# Patient Record
Sex: MALE | Race: WHITE | NOT HISPANIC OR LATINO | Employment: FULL TIME | URBAN - NONMETROPOLITAN AREA
[De-identification: names, ages, dates, MRNs, and addresses within clinical notes are randomized per-mention and may not be internally consistent; named-entity substitution may affect disease eponyms.]

---

## 2017-01-27 ENCOUNTER — OFFICE VISIT (OUTPATIENT)
Dept: FAMILY MEDICINE CLINIC | Facility: CLINIC | Age: 52
End: 2017-01-27

## 2017-01-27 VITALS
WEIGHT: 244 LBS | HEIGHT: 68 IN | DIASTOLIC BLOOD PRESSURE: 86 MMHG | BODY MASS INDEX: 36.98 KG/M2 | HEART RATE: 72 BPM | TEMPERATURE: 96 F | SYSTOLIC BLOOD PRESSURE: 138 MMHG

## 2017-01-27 DIAGNOSIS — I10 ESSENTIAL HYPERTENSION: ICD-10-CM

## 2017-01-27 DIAGNOSIS — Z23 ENCOUNTER FOR IMMUNIZATION: ICD-10-CM

## 2017-01-27 DIAGNOSIS — E11.9 TYPE 2 DIABETES MELLITUS WITHOUT COMPLICATION, WITHOUT LONG-TERM CURRENT USE OF INSULIN (HCC): Primary | ICD-10-CM

## 2017-01-27 DIAGNOSIS — Z12.11 SCREENING FOR COLORECTAL CANCER: ICD-10-CM

## 2017-01-27 DIAGNOSIS — Z12.12 SCREENING FOR COLORECTAL CANCER: ICD-10-CM

## 2017-01-27 DIAGNOSIS — E11.9 COMPREHENSIVE DIABETIC FOOT EXAMINATION, TYPE 2 DM, ENCOUNTER FOR (HCC): ICD-10-CM

## 2017-01-27 DIAGNOSIS — B35.1 ONYCHOMYCOSIS: ICD-10-CM

## 2017-01-27 LAB — ALBUMIN UR-MCNC: 0.7 MG/L

## 2017-01-27 PROCEDURE — 90732 PPSV23 VACC 2 YRS+ SUBQ/IM: CPT | Performed by: FAMILY MEDICINE

## 2017-01-27 PROCEDURE — 82043 UR ALBUMIN QUANTITATIVE: CPT | Performed by: FAMILY MEDICINE

## 2017-01-27 PROCEDURE — 99214 OFFICE O/P EST MOD 30 MIN: CPT | Performed by: FAMILY MEDICINE

## 2017-01-27 PROCEDURE — 90471 IMMUNIZATION ADMIN: CPT | Performed by: FAMILY MEDICINE

## 2017-01-27 RX ORDER — LISINOPRIL 5 MG/1
5 TABLET ORAL DAILY
Qty: 90 TABLET | Refills: 2 | Status: SHIPPED | OUTPATIENT
Start: 2017-01-27 | End: 2017-09-05 | Stop reason: SDUPTHER

## 2017-01-27 NOTE — PROGRESS NOTES
Subjective   Chief Complaint   Patient presents with   • lab work follow up     2 month f/u     Cefernio Pereira is a 51 y.o. male.   lab work follow up (2 month f/u)    Diabetes   He presents for his follow-up diabetic visit. He has type 2 diabetes mellitus. His disease course has been stable. There are no hypoglycemic associated symptoms. Pertinent negatives for hypoglycemia include no dizziness or headaches. Associated symptoms include foot paresthesias. Pertinent negatives for diabetes include no blurred vision, no chest pain, no fatigue, no polydipsia, no polyphagia, no polyuria and no visual change. There are no hypoglycemic complications. Symptoms are stable. There are no diabetic complications. Pertinent negatives for diabetic complications include no peripheral neuropathy or retinopathy. Risk factors for coronary artery disease include diabetes mellitus, obesity and sedentary lifestyle. Current diabetic treatment includes diet. He is compliant with treatment all of the time. His weight is stable. He is following a diabetic diet. Meal planning includes avoidance of concentrated sweets. He has not had a previous visit with a dietitian. He never participates in exercise. He monitors blood glucose at home 3-4 x per week. Blood glucose monitoring compliance is excellent. There is no change in his home blood glucose trend. His breakfast blood glucose range is generally 110-130 mg/dl. He does not see a podiatrist.Eye exam is current.      The following portions of the patient's history were reviewed and updated as appropriate: allergies, current medications, past family history, past medical history, past social history, past surgical history and problem list.    Review of Systems   Constitutional: Negative for appetite change, chills, fatigue and fever.   HENT: Negative for congestion, ear pain, rhinorrhea and sore throat.    Eyes: Negative for blurred vision and pain.   Respiratory: Negative for cough and  "shortness of breath.    Cardiovascular: Negative for chest pain and palpitations.   Gastrointestinal: Negative for abdominal pain, constipation, diarrhea and nausea.   Endocrine: Negative for polydipsia, polyphagia and polyuria.   Genitourinary: Negative for dysuria.   Musculoskeletal: Negative for back pain, joint swelling and neck pain.   Skin: Negative for rash.   Neurological: Negative for dizziness and headaches.     Objective   Visit Vitals   • /86   • Pulse 72   • Temp 96 °F (35.6 °C)   • Ht 68\" (172.7 cm)   • Wt 244 lb (111 kg)   • BMI 37.1 kg/m2     Physical Exam   Constitutional: He is oriented to person, place, and time. He appears well-developed and well-nourished.   HENT:   Head: Normocephalic and atraumatic.   Eyes: Pupils are equal, round, and reactive to light.   Neck: Normal range of motion. Neck supple.   Cardiovascular: Normal rate, regular rhythm and normal heart sounds.    Pulmonary/Chest: Effort normal and breath sounds normal. No respiratory distress. He has no wheezes. He has no rales.   Abdominal: Soft. Bowel sounds are normal.   Musculoskeletal: Normal range of motion.    Ceferino had a diabetic foot exam performed today.   During the foot exam he had a monofilament test performed (10/10).    Neurological Sensory Findings -  Unaltered sharp/dull right ankle/foot discrimination and unaltered sharp/dull left ankle/foot discrimination. No right ankle/foot altered proprioception and no left ankle/foot altered proprioception    Vascular Status -  His exam exhibits right foot vasculature normal. His exam exhibits no right foot edema. His exam exhibits left foot vasculature normal. His exam exhibits no left foot edema.   Skin Integrity  -  His right foot skin is intact. He has right foot onychomycosis.  He hasno right foot ulcer, non-callous right foot,  no ingrown toenail on right foot, right heel is not dry and cracked, no right foot warmth and no right foot blister.   Ceferino's left foot skin is " intact. He has left foot onychomycosis. He has no left foot ulcer, non-callous left foot, no left ingrown toenail, no left heel dry and cracked, no left foot warmth and no left foot blister..  Neurological: He is alert and oriented to person, place, and time.   Skin: Skin is warm and dry.   Psychiatric: He has a normal mood and affect.   Nursing note and vitals reviewed.      Assessment/Plan   Problems Addressed this Visit        Cardiovascular and Mediastinum    Essential hypertension    Relevant Medications    lisinopril (PRINIVIL,ZESTRIL) 5 MG tablet       Endocrine    Type 2 diabetes mellitus without complication, without long-term current use of insulin - Primary    Relevant Orders    MicroAlbumin, Urine, Random       Musculoskeletal and Integument    Onychomycosis      Other Visit Diagnoses     Encounter for immunization        Relevant Orders    Pneumococcal Polysaccharide Vaccine 23-Valent Greater Than or Equal To 1yo Subcutaneous / IM (Completed)    Screening for colorectal cancer        Relevant Orders    Ambulatory Referral to General Surgery    Comprehensive diabetic foot examination, type 2 DM, encounter for            Continue with diet for dm    Continue with lamsil until course completed    Add lisinopril today for kidney and bp    Microalbuminuria test today    Pneumococcal vaccine today    Dm eye exam done, documented    Referral to general surgery with Dr Ward for screening colonoscopy    Recheck in 3 months

## 2017-01-27 NOTE — MR AVS SNAPSHOT
Ceferino Pereira   1/27/2017 3:30 PM   Office Visit    Dept Phone:  [phone]   Encounter #:  [id number]    Provider:  Kisha Tomlin MD   Department:  [facility]                Your Full Care Plan              Today's Medication Changes          These changes are accurate as of: 1/27/17  3:53 PM.  If you have any questions, ask your nurse or doctor.               New Medication(s)Ordered:     lisinopril 5 MG tablet   Commonly known as:  PRINIVIL,ZESTRIL   Take 1 tablet by mouth Daily.   Started by:  Kisha Tomlin MD            Where to Get Your Medications      These medications were sent to [facility] - [place], [place] - [address] - [phone]  - [phone]   [address] [facility] [address]     Phone:  [phone]     lisinopril 5 MG tablet                  Your Updated Medication List          This list is accurate as of: 1/27/17  3:53 PM.  Always use your most recent med list.                lisinopril 5 MG tablet   Commonly known as:  PRINIVIL,ZESTRIL   Take 1 tablet by mouth Daily.       terbinafine 250 MG tablet   Commonly known as:  lamiSIL   Take 1 tablet by mouth Daily.               We Performed the Following     Ambulatory Referral to General Surgery     MicroAlbumin, Urine, Random     Pneumococcal Polysaccharide Vaccine 23-Valent Greater Than or Equal To 3yo Subcutaneous / IM       You Were Diagnosed With        Codes Comments    Type 2 diabetes mellitus without complication, without long-term current use of insulin    -  Primary ICD-10-CM: E11.9  ICD-9-CM: 250.00     Onychomycosis     ICD-10-CM: B35.1  ICD-9-CM: 110.1     Encounter for immunization     ICD-10-CM: Z23  ICD-9-CM: V03.89     Essential hypertension     ICD-10-CM: I10  ICD-9-CM: 401.9     Screening for colorectal cancer     ICD-10-CM: Z12.11, Z12.12  ICD-9-CM: V76.51     Comprehensive diabetic foot examination, type 2 DM,  "encounter for     ICD-10-CM: E11.9  ICD-9-CM: 250.00       Instructions     None    Patient Instructions History      Upcoming Appointments     Visit Type Date Time Department    OFFICE VISIT 1/27/2017  3:30 PM [facility]    FOLLOW UP 10/26/2017  3:30 PM [facility] DANNI Bustillo Signup     Our records indicate that you have declined [facility] [email or web address] signup. If you would like to sign up for [email or web address], please email [place] [email or web address] or call [phone] to obtain an activation code.             Other Info from Your Visit           Your Appointments     Oct 26, 2017  3:30 PM CDT   Follow Up with [facility]EP [facility] (--)    [address] [place] [address] [address] [place] [address] [phone]           Arrive 15 minutes prior to appointment.              Allergies     Penicillins        Reason for Visit     lab work follow up 2 month f/u      Vital Signs     Blood Pressure Pulse Temperature Height Weight Body Mass Index    138/86 72 96 °F (35.6 °C) 68\" (172.7 cm) 244 lb (111 kg) 37.1 kg/m2    Smoking Status                   Never Smoker           Problems and Diagnoses Noted     Onychomycosis    Type 2 diabetes mellitus without complication, without long-term current use of insulin    Encounter for immunization        High blood pressure        Screen for colon cancer        Comprehensive diabetic foot examination, type 2 DM, encounter for          No Longer an Issue     Cellulitis and abscess of foot    Foot pain, right        "

## 2017-02-03 DIAGNOSIS — Z12.11 SCREENING FOR MALIGNANT NEOPLASM OF COLON: Primary | ICD-10-CM

## 2017-02-06 RX ORDER — SODIUM CHLORIDE 0.9 % (FLUSH) 0.9 %
1-10 SYRINGE (ML) INJECTION AS NEEDED
Status: CANCELLED | OUTPATIENT
Start: 2017-03-03

## 2017-03-03 ENCOUNTER — ANESTHESIA EVENT (OUTPATIENT)
Dept: GASTROENTEROLOGY | Facility: HOSPITAL | Age: 52
End: 2017-03-03

## 2017-03-03 ENCOUNTER — HOSPITAL ENCOUNTER (OUTPATIENT)
Facility: HOSPITAL | Age: 52
Setting detail: HOSPITAL OUTPATIENT SURGERY
Discharge: HOME | End: 2017-03-03
Attending: SURGERY | Admitting: SURGERY

## 2017-03-03 ENCOUNTER — ANESTHESIA (OUTPATIENT)
Dept: GASTROENTEROLOGY | Facility: HOSPITAL | Age: 52
End: 2017-03-03

## 2017-03-03 VITALS
OXYGEN SATURATION: 97 % | HEART RATE: 64 BPM | DIASTOLIC BLOOD PRESSURE: 72 MMHG | WEIGHT: 235.89 LBS | TEMPERATURE: 97.3 F | RESPIRATION RATE: 18 BRPM | HEIGHT: 68 IN | SYSTOLIC BLOOD PRESSURE: 114 MMHG | BODY MASS INDEX: 35.75 KG/M2

## 2017-03-03 DIAGNOSIS — Z12.11 SCREENING FOR MALIGNANT NEOPLASM OF COLON: ICD-10-CM

## 2017-03-03 PROCEDURE — 45378 DIAGNOSTIC COLONOSCOPY: CPT | Performed by: SURGERY

## 2017-03-03 PROCEDURE — 25010000002 PROPOFOL 10 MG/ML EMULSION: Performed by: NURSE ANESTHETIST, CERTIFIED REGISTERED

## 2017-03-03 RX ORDER — PROPOFOL 10 MG/ML
VIAL (ML) INTRAVENOUS AS NEEDED
Status: DISCONTINUED | OUTPATIENT
Start: 2017-03-03 | End: 2017-03-03

## 2017-03-03 RX ORDER — SODIUM CHLORIDE 0.9 % (FLUSH) 0.9 %
1-10 SYRINGE (ML) INJECTION AS NEEDED
Status: DISCONTINUED | OUTPATIENT
Start: 2017-03-03 | End: 2017-03-03 | Stop reason: HOSPADM

## 2017-03-03 RX ORDER — PROPOFOL 10 MG/ML
VIAL (ML) INTRAVENOUS AS NEEDED
Status: DISCONTINUED | OUTPATIENT
Start: 2017-03-03 | End: 2017-03-03 | Stop reason: SURG

## 2017-03-03 RX ORDER — DEXTROSE AND SODIUM CHLORIDE 5; .45 G/100ML; G/100ML
20 INJECTION, SOLUTION INTRAVENOUS CONTINUOUS
Status: DISCONTINUED | OUTPATIENT
Start: 2017-03-03 | End: 2017-03-03 | Stop reason: HOSPADM

## 2017-03-03 RX ADMIN — PROPOFOL 40 MG: 10 INJECTION, EMULSION INTRAVENOUS at 10:39

## 2017-03-03 RX ADMIN — PROPOFOL 50 MG: 10 INJECTION, EMULSION INTRAVENOUS at 10:33

## 2017-03-03 RX ADMIN — PROPOFOL 30 MG: 10 INJECTION, EMULSION INTRAVENOUS at 10:30

## 2017-03-03 RX ADMIN — PROPOFOL 100 MG: 10 INJECTION, EMULSION INTRAVENOUS at 10:27

## 2017-03-03 RX ADMIN — PROPOFOL 40 MG: 10 INJECTION, EMULSION INTRAVENOUS at 10:35

## 2017-03-03 RX ADMIN — PROPOFOL 30 MG: 10 INJECTION, EMULSION INTRAVENOUS at 10:37

## 2017-03-03 RX ADMIN — DEXTROSE AND SODIUM CHLORIDE 20 ML/HR: 5; 450 INJECTION, SOLUTION INTRAVENOUS at 10:05

## 2017-03-03 NOTE — PLAN OF CARE
Problem: GI Endoscopy (Adult)  Goal: Signs and Symptoms of Listed Potential Problems Will be Absent or Manageable (GI Endoscopy)    03/03/17 1042   GI Endoscopy   Problems Assessed (GI Endoscopy) all   Problems Present (GI Endoscopy) none         Problem: Patient Care Overview (Adult)  Goal: Plan of Care Review    03/03/17 1042   Coping/Psychosocial Response Interventions   Plan Of Care Reviewed With patient   Patient Care Overview   Progress no change   Outcome Evaluation   Outcome Summary/Follow up Plan vss, pt alert, no nausea

## 2017-03-03 NOTE — ANESTHESIA PREPROCEDURE EVALUATION
Anesthesia Evaluation     no history of anesthetic complications:  NPO Status: > 4 hours   Airway   Mallampati: II  TM distance: >3 FB  Neck ROM: full  no difficulty expected  Dental - normal exam     Pulmonary - normal exam   (+) recent URI, sleep apnea on CPAP,   Cardiovascular - normal exam    (+) hypertension well controlled,       Neuro/Psych  GI/Hepatic/Renal/Endo    (+)  GERD well controlled, diabetes mellitus,     ROS Comment: Borderline DM    Musculoskeletal     Abdominal    Substance History - negative use     OB/GYN          Other   (+) arthritis                                 Anesthesia Plan    ASA 3     MAC     intravenous induction   Anesthetic plan and risks discussed with patient.    Plan discussed with CRNA.

## 2017-03-03 NOTE — ANESTHESIA POSTPROCEDURE EVALUATION
Patient: Ceferino Pereira    Procedure Summary     Date Anesthesia Start Anesthesia Stop Room / Location    03/03/17 1020 1042 [facility] ENDOSCOPY 2 / [facility] ENDOSCOPY       Procedure Diagnosis Surgeon Provider    COLONOSCOPY (N/A ) Screening for malignant neoplasm of colon  (Screening for malignant neoplasm of colon [Z12.11]) Delmar Ward MD No responsible provider of record.          Anesthesia Type: MAC  Last vitals  /72 (03/03/17 0957)    Temp 97.9 °F (36.6 °C) (03/03/17 0957)    Pulse 62 (03/03/17 0957)   Resp 18 (03/03/17 0957)    SpO2 98 % (03/03/17 0957)      Post Anesthesia Care and Evaluation    Patient location during evaluation: bedside  Patient participation: complete - patient participated  Level of consciousness: awake and alert  Pain score: 0  Pain management: adequate  Airway patency: patent  Anesthetic complications: No anesthetic complications  PONV Status: none  Cardiovascular status: acceptable  Respiratory status: acceptable  Hydration status: acceptable

## 2017-03-03 NOTE — PLAN OF CARE
Problem: GI Endoscopy (Adult)  Goal: Signs and Symptoms of Listed Potential Problems Will be Absent or Manageable (GI Endoscopy)  Outcome: Outcome(s) achieved Date Met:  03/03/17 03/03/17 1054   GI Endoscopy   Problems Assessed (GI Endoscopy) all   Problems Present (GI Endoscopy) none         Problem: Patient Care Overview (Adult)  Goal: Plan of Care Review  Outcome: Outcome(s) achieved Date Met:  03/03/17 03/03/17 1054   Coping/Psychosocial Response Interventions   Plan Of Care Reviewed With patient   Patient Care Overview   Progress no change

## 2017-03-03 NOTE — H&P
No chief complaint on file.  Dr Tomlin referred for screening    Ceferino Pereira is a 51 y.o. male referred today for evaluation for colonoscopy.  He notes no change in bowel habits, no blood in the stool.     Prior Colonoscopy:no  Prior Polyps:no  Family History of Colon Cancer:yes  On anticoagulation:no    Past Surgical History   Procedure Laterality Date   • Injection of medication  05/08/2013     kenalog   • Foot surgery       infection top of foot   • Hand surgery       Past Medical History   Diagnosis Date   • Aching leg syndrome    • Acute bronchitis    • Acute maxillary sinusitis    • Adult BMI 30+      obesity   • Ankle joint pain    • Arthritis    • Bacterial infectious disease    • Blood in feces    • BMI 37.0-37.9, adult    • Carpal tunnel syndrome    • Chronic pharyngitis    • Contact dermatitis    • Derangement of posterior horn of medial meniscus due to old tear or injury, unspecified knee      Old tear of posterior horn of medial meniscus    • Derangement of unspecified medial meniscus due to old tear or injury, left knee    • Diabetes mellitus    • Diabetes mellitus screening    • Fatigue    • Gastroesophageal reflux disease    • General medical exam    • Health maintenance examination      individual health exam   • Infected insect bite    • Knee pain    • Nausea and vomiting    • Need for immunization against influenza    • LAVELL on CPAP    • Osteoarthritis    • Osteoarthritis of knee    • Plantar fasciitis    • Radicular pain of lumbosacral region      Pain radiating to lumbar region of back     • Screening for hyperlipidemia    • Shoulder pain      left shoulder   • Thyroid disorder screening    • Upper respiratory infection    • Urticaria      Social History     Social History   • Marital status:      Spouse name: N/A   • Number of children: N/A   • Years of education: N/A     Occupational History   • Not on file.     Social History Main Topics   • Smoking status: Never Smoker   •  Smokeless tobacco: Not on file   • Alcohol use No   • Drug use: No   • Sexual activity: Defer     Other Topics Concern   • Not on file     Social History Narrative     Current Facility-Administered Medications   Medication Dose Route Frequency Provider Last Rate Last Dose   • dextrose 5 % and sodium chloride 0.45 % infusion  20 mL/hr Intravenous Continuous Delmar Ward MD 20 mL/hr at 03/03/17 1005 20 mL/hr at 03/03/17 1005   • sodium chloride 0.9 % flush 1-10 mL  1-10 mL Intravenous PRN Delmar Ward MD           Review of Systems   Constitutional: Negative.    HENT: Negative for hearing loss, nosebleeds and trouble swallowing.    Respiratory: Negative for apnea, chest tightness and shortness of breath.    Cardiovascular: Negative for chest pain and palpitations.   Gastrointestinal: Negative for abdominal distention, abdominal pain, blood in stool, constipation, diarrhea, nausea and vomiting.   Genitourinary: Negative for difficulty urinating, dysuria, frequency and urgency.   Musculoskeletal: Negative for back pain, joint swelling and neck pain.   Skin: Negative for rash.   Neurological: Negative for dizziness, seizures, weakness, light-headedness, numbness and headaches.   Hematological: Negative for adenopathy.   Psychiatric/Behavioral: Negative for agitation. The patient is not nervous/anxious.        Vitals:    03/03/17 0957   BP: 117/72   Pulse: 62   Resp: 18   Temp: 97.9 °F (36.6 °C)   SpO2: 98%       Physical Exam   Constitutional: He is oriented to person, place, and time. He appears well-developed and well-nourished. No distress.   HENT:   Head: Normocephalic and atraumatic.   Nose: Nose normal.   Eyes: Conjunctivae are normal. No scleral icterus.   Neck: Normal range of motion. No JVD present. No tracheal deviation present. No thyromegaly present.   Cardiovascular: Normal rate, regular rhythm and normal heart sounds.    No murmur heard.  Pulmonary/Chest: Effort normal and breath sounds normal. No  stridor. No respiratory distress. He has no wheezes. He has no rales. He exhibits no tenderness.   Abdominal: Soft. He exhibits no distension. There is no tenderness. There is no rebound and no guarding. No hernia.   Musculoskeletal: He exhibits no tenderness or deformity.   Neurological: He is alert and oriented to person, place, and time.   Skin: Skin is warm and dry. No rash noted.   Psychiatric: He has a normal mood and affect. His behavior is normal. Judgment and thought content normal.   Vitals reviewed.      Assessment     In need of screening colonoscopy.    Plan     Risks, benefits, rationale and prep for colonoscopy have been discussed with the patient.  The patient indicates understanding of these issues and agrees with the plan.

## 2017-09-05 ENCOUNTER — LAB (OUTPATIENT)
Dept: LAB | Facility: OTHER | Age: 52
End: 2017-09-05

## 2017-09-05 DIAGNOSIS — E11.9 TYPE 2 DIABETES MELLITUS WITHOUT COMPLICATION, WITHOUT LONG-TERM CURRENT USE OF INSULIN (HCC): ICD-10-CM

## 2017-09-05 DIAGNOSIS — R53.83 FATIGUE, UNSPECIFIED TYPE: ICD-10-CM

## 2017-09-05 DIAGNOSIS — I10 ESSENTIAL HYPERTENSION: ICD-10-CM

## 2017-09-05 DIAGNOSIS — E11.9 TYPE 2 DIABETES MELLITUS WITHOUT COMPLICATION, WITHOUT LONG-TERM CURRENT USE OF INSULIN (HCC): Primary | ICD-10-CM

## 2017-09-05 LAB
HBA1C MFR BLD: 6.4 % (ref 4–5.6)
TSH SERPL DL<=0.05 MIU/L-ACNC: 3.31 MIU/ML (ref 0.46–4.68)

## 2017-09-05 PROCEDURE — 80074 ACUTE HEPATITIS PANEL: CPT | Performed by: FAMILY MEDICINE

## 2017-09-05 PROCEDURE — 84443 ASSAY THYROID STIM HORMONE: CPT | Performed by: FAMILY MEDICINE

## 2017-09-05 PROCEDURE — 83036 HEMOGLOBIN GLYCOSYLATED A1C: CPT | Performed by: FAMILY MEDICINE

## 2017-09-05 PROCEDURE — 36415 COLL VENOUS BLD VENIPUNCTURE: CPT | Performed by: FAMILY MEDICINE

## 2017-09-05 RX ORDER — LISINOPRIL 5 MG/1
5 TABLET ORAL DAILY
Qty: 90 TABLET | Refills: 2 | Status: SHIPPED | OUTPATIENT
Start: 2017-09-05 | End: 2018-01-08 | Stop reason: SDUPTHER

## 2017-09-05 NOTE — TELEPHONE ENCOUNTER
[facility]  [facility]

## 2017-09-06 LAB
HAV IGM SERPL QL IA: NEGATIVE
HBV CORE IGM SERPL QL IA: NEGATIVE
HBV SURFACE AG SERPL QL IA: NEGATIVE
HCV AB SER DONR QL: NEGATIVE

## 2017-10-09 ENCOUNTER — OFFICE VISIT (OUTPATIENT)
Dept: FAMILY MEDICINE CLINIC | Facility: CLINIC | Age: 52
End: 2017-10-09

## 2017-10-09 VITALS
WEIGHT: 241 LBS | DIASTOLIC BLOOD PRESSURE: 84 MMHG | BODY MASS INDEX: 36.53 KG/M2 | OXYGEN SATURATION: 98 % | SYSTOLIC BLOOD PRESSURE: 130 MMHG | HEIGHT: 68 IN | TEMPERATURE: 97 F | HEART RATE: 68 BPM

## 2017-10-09 DIAGNOSIS — E11.9 TYPE 2 DIABETES MELLITUS WITHOUT COMPLICATION, WITHOUT LONG-TERM CURRENT USE OF INSULIN (HCC): Primary | ICD-10-CM

## 2017-10-09 DIAGNOSIS — Z23 INFLUENZA VACCINE NEEDED: ICD-10-CM

## 2017-10-09 DIAGNOSIS — E66.09 CLASS 2 OBESITY DUE TO EXCESS CALORIES WITHOUT SERIOUS COMORBIDITY WITH BODY MASS INDEX (BMI) OF 36.0 TO 36.9 IN ADULT: ICD-10-CM

## 2017-10-09 PROBLEM — I10 ESSENTIAL HYPERTENSION: Status: RESOLVED | Noted: 2017-01-27 | Resolved: 2017-10-09

## 2017-10-09 PROCEDURE — 99214 OFFICE O/P EST MOD 30 MIN: CPT | Performed by: FAMILY MEDICINE

## 2017-10-09 PROCEDURE — 90471 IMMUNIZATION ADMIN: CPT | Performed by: FAMILY MEDICINE

## 2017-10-09 PROCEDURE — 90686 IIV4 VACC NO PRSV 0.5 ML IM: CPT | Performed by: FAMILY MEDICINE

## 2017-10-09 NOTE — PROGRESS NOTES
Subjective   Chief Complaint   Patient presents with   • Diabetes     9 months   • Flu Vaccine     Ceferino Pereira is a 52 y.o. male.   Diabetes (9 months) and Flu Vaccine    History of Present Illness     Diabetes   He presents for his follow-up diabetic visit. He has type 2 diabetes mellitus. His disease course has been stable. There are no hypoglycemic associated symptoms. Pertinent negatives for hypoglycemia include no dizziness or headaches. Associated symptoms include foot paresthesias. Pertinent negatives for diabetes include no blurred vision, no chest pain, no fatigue, no polydipsia, no polyphagia, no polyuria and no visual change. There are no hypoglycemic complications. Symptoms are stable. There are no diabetic complications. Pertinent negatives for diabetic complications include no peripheral neuropathy or retinopathy. Risk factors for coronary artery disease include diabetes mellitus, obesity and sedentary lifestyle. Current diabetic treatment includes diet. He is compliant with treatment all of the time. His weight is variable. He is following a diabetic diet. Meal planning includes avoidance of concentrated sweets. He has not had a previous visit with a dietitian. He never participates in exercise. He monitors blood glucose at home 3-4 x per week. Blood glucose monitoring compliance is excellent. There is no change in his home blood glucose trend. His breakfast blood glucose range is generally 110-130 mg/dl. He does not see a podiatrist. Eye exam is current.   Recommended annual diabetic eye exam - due in November    Flu vaccine today.    Obesity - remains uncontrolled, weight is variable    The following portions of the patient's history were reviewed and updated as appropriate: allergies, current medications, past family history, past medical history, past social history, past surgical history and problem list.    Review of Systems   Constitutional: Negative for appetite change, chills, fatigue  "and fever.   HENT: Negative for congestion, ear pain, rhinorrhea and sore throat.    Eyes: Negative for pain.   Respiratory: Negative for cough and shortness of breath.    Cardiovascular: Negative for chest pain and palpitations.   Gastrointestinal: Negative for abdominal pain, constipation, diarrhea and nausea.   Genitourinary: Negative for dysuria.   Musculoskeletal: Negative for back pain, joint swelling and neck pain.   Skin: Negative for rash.   Neurological: Negative for dizziness and headaches.       Objective   /84  Pulse 68  Temp 97 °F (36.1 °C)  Ht 68\" (172.7 cm)  Wt 241 lb (109 kg)  SpO2 98%  BMI 36.64 kg/m2  Physical Exam   Constitutional: He is oriented to person, place, and time. He appears well-developed and well-nourished.   HENT:   Head: Normocephalic and atraumatic.   Eyes: Pupils are equal, round, and reactive to light.   Neck: Normal range of motion. Neck supple.   Cardiovascular: Normal rate, regular rhythm and normal heart sounds.    Pulmonary/Chest: Effort normal and breath sounds normal. No respiratory distress. He has no wheezes. He has no rales.   Abdominal: Soft. Bowel sounds are normal.   Musculoskeletal: Normal range of motion.   Neurological: He is alert and oriented to person, place, and time.   Skin: Skin is warm and dry.   Psychiatric: He has a normal mood and affect.   Nursing note and vitals reviewed.      Assessment/Plan   Problems Addressed this Visit        Digestive    Class 2 obesity due to excess calories without serious comorbidity with body mass index (BMI) of 36.0 to 36.9 in adult       Endocrine    Type 2 diabetes mellitus without complication, without long-term current use of insulin - Primary      Other Visit Diagnoses     Influenza vaccine needed        Relevant Orders    Flu Vaccine Quad PF 3YR+ (Completed)        Flu shot given today  Consent signed    Reviewed maintenance  Diabetic eye exam due in November  Recommended an appointment to be done before " next office visit  Let Mary know when seen so we can ask for evaluation    Recheck in 3 months  For labs and diabetic foot exam

## 2017-11-08 ENCOUNTER — OFFICE VISIT (OUTPATIENT)
Dept: SLEEP MEDICINE | Facility: HOSPITAL | Age: 52
End: 2017-11-08

## 2017-11-08 VITALS
SYSTOLIC BLOOD PRESSURE: 130 MMHG | WEIGHT: 243 LBS | BODY MASS INDEX: 36.83 KG/M2 | HEIGHT: 68 IN | DIASTOLIC BLOOD PRESSURE: 80 MMHG

## 2017-11-08 DIAGNOSIS — G47.33 OBSTRUCTIVE SLEEP APNEA, ADULT: Primary | ICD-10-CM

## 2017-11-08 PROCEDURE — 99213 OFFICE O/P EST LOW 20 MIN: CPT | Performed by: INTERNAL MEDICINE

## 2017-11-08 NOTE — PROGRESS NOTES
Sleep Clinic Follow Up    Date: 11/8/2017  Primary Care Physician: Kisha Tomlin MD      Interim History (1/3):  Since the last visit on 10/25/2016, patient has:      1)  LAVELL - Has remained compliant with CPAP. He denies mask and machine issues, dry mouth, headaches, pressures intolerance, or non-compliance. He denies abnormal dreams, sleep paralysis, hypnagogic hallucinations, or cataplexy symptoms.     PAP Data:  Time frame: 10/25/2016-11/07/2017   Compliance 100 %  PAP range : 15 cm H2O  Average 90% pressure: 15 cmH2O  Leak: 25.75 minutes   Average AHI 2.5 events/hr  Mask type: nasal with chinstrap  DME: [facility]    Bed time: 2100  Sleep latency: 10-20 minutes  Number of times awakens during the night: 2-3  Wake time: 0500  Estimated total sleep time at night: 6-7 hours  Caffeine intake: 1-2 cafes, (1) 20 oz soda  Alcohol intake: no  Nap time: none  Sleepiness with Driving: none    [place] - 4    PMHx, FH, SH reviewed and pertinent changes are: unchanged from last office visit on 10/25/2016      REVIEW OF SYSTEMS:   Negative for chest pain, fever, chills, SOA, abdominal pain. Smoking: none      Exam (6-11/12):    Vitals:    11/08/17 1606   BP: 130/80     Body mass index is 36.95 kg/(m^2).  Gen:  No distress, conversant, pleasant, appears stated age, alert, oriented  Eyes:   Anicteric sclera, moist conjunctiva, no lid lag     PERRLA, EOMI   Heent:   NC/AT    Oropharynx clear, Mallampati 4, overbite    normal hearing    Lungs:  Normal effort, non-labored breathing    Clear to auscultation    CV:  Normal S1/S2, no murmur    no lower extremity edema  ABD:  Soft, normal bowel sounds, obese       Psych:  Appropriate affect  Neuro:  CN 2-12 intact    Past Medical History:   Diagnosis Date   • Aching leg syndrome    • Acute bronchitis    • Acute maxillary sinusitis    • Adult BMI 30+     obesity   • Ankle joint pain    • Arthritis    • Bacterial infectious disease    • Blood in feces    • BMI 37.0-37.9, adult    •  Carpal tunnel syndrome    • Chronic pharyngitis    • Contact dermatitis    • Derangement of posterior horn of medial meniscus due to old tear or injury, unspecified knee     Old tear of posterior horn of medial meniscus    • Derangement of unspecified medial meniscus due to old tear or injury, left knee    • Diabetes mellitus    • Diabetes mellitus screening    • Fatigue    • Gastroesophageal reflux disease    • General medical exam    • Health maintenance examination     individual health exam   • Infected insect bite    • Knee pain    • Nausea and vomiting    • Need for immunization against influenza    • LAVELL on CPAP    • Osteoarthritis    • Osteoarthritis of knee    • Plantar fasciitis    • Radicular pain of lumbosacral region     Pain radiating to lumbar region of back     • Screening for hyperlipidemia    • Shoulder pain     left shoulder   • Thyroid disorder screening    • Upper respiratory infection    • Urticaria        Current Outpatient Prescriptions:   •  lisinopril (PRINIVIL,ZESTRIL) 5 MG tablet, Take 1 tablet by mouth Daily., Disp: 90 tablet, Rfl: 2      ASSESSMENT / PLAN:     1. Obstructive sleep apnea  1. PSG on 10/27/2015, AHI of 95  2. CPAP titration on same day, recommended 14 cm H2O  3. Currently on 15 cm H2O  4. Continue PAP as prescribed.   5. Script for PAP supplies  6. Return to clinic in 1 year with compliance check unless sx change in the interim period.    Total time 15 min, more than half spent in face to face counseling and coordination of care.     This document has been electronically signed by Art Cornell MD on November 8, 2017         CC: Kisha Tomlin MD          No ref. provider found

## 2018-01-03 ENCOUNTER — LAB (OUTPATIENT)
Dept: LAB | Facility: HOSPITAL | Age: 53
End: 2018-01-03

## 2018-01-03 DIAGNOSIS — Z00.00 ROUTINE GENERAL MEDICAL EXAMINATION AT A HEALTH CARE FACILITY: Primary | ICD-10-CM

## 2018-01-03 DIAGNOSIS — R53.83 FATIGUE, UNSPECIFIED TYPE: ICD-10-CM

## 2018-01-03 DIAGNOSIS — Z00.00 ROUTINE GENERAL MEDICAL EXAMINATION AT A HEALTH CARE FACILITY: ICD-10-CM

## 2018-01-03 LAB
ALBUMIN SERPL-MCNC: 4.4 G/DL (ref 3.4–4.8)
ALBUMIN/GLOB SERPL: 1.4 G/DL (ref 1.1–1.8)
ALP SERPL-CCNC: 72 U/L (ref 38–126)
ALT SERPL W P-5'-P-CCNC: 34 U/L (ref 21–72)
ANION GAP SERPL CALCULATED.3IONS-SCNC: 8 MMOL/L (ref 5–15)
AST SERPL-CCNC: 58 U/L (ref 17–59)
BILIRUB SERPL-MCNC: 0.5 MG/DL (ref 0.2–1.3)
BUN BLD-MCNC: 11 MG/DL (ref 7–21)
BUN/CREAT SERPL: 15.7 (ref 7–25)
CALCIUM SPEC-SCNC: 9.8 MG/DL (ref 8.4–10.2)
CHLORIDE SERPL-SCNC: 101 MMOL/L (ref 95–110)
CO2 SERPL-SCNC: 30 MMOL/L (ref 22–31)
CREAT BLD-MCNC: 0.7 MG/DL (ref 0.7–1.3)
GFR SERPL CREATININE-BSD FRML MDRD: 118 ML/MIN/1.73 (ref 56–130)
GLOBULIN UR ELPH-MCNC: 3.1 GM/DL (ref 2.3–3.5)
GLUCOSE BLD-MCNC: 127 MG/DL (ref 60–100)
HBA1C MFR BLD: 6.1 % (ref 4–5.6)
POTASSIUM BLD-SCNC: 5 MMOL/L (ref 3.5–5.1)
PROT SERPL-MCNC: 7.5 G/DL (ref 6.3–8.6)
SODIUM BLD-SCNC: 139 MMOL/L (ref 137–145)
T4 FREE SERPL-MCNC: 1.07 NG/DL (ref 0.78–2.19)

## 2018-01-03 PROCEDURE — 83036 HEMOGLOBIN GLYCOSYLATED A1C: CPT

## 2018-01-03 PROCEDURE — 36415 COLL VENOUS BLD VENIPUNCTURE: CPT

## 2018-01-03 PROCEDURE — 84439 ASSAY OF FREE THYROXINE: CPT

## 2018-01-03 PROCEDURE — 80053 COMPREHEN METABOLIC PANEL: CPT

## 2018-01-04 ENCOUNTER — TELEPHONE (OUTPATIENT)
Dept: FAMILY MEDICINE CLINIC | Facility: CLINIC | Age: 53
End: 2018-01-04

## 2018-01-04 NOTE — TELEPHONE ENCOUNTER
----- Message from Kisha Tomlin MD sent at 1/3/2018 11:03 AM CST -----  a1c at goal,  Thyroid normal.

## 2018-01-08 ENCOUNTER — OFFICE VISIT (OUTPATIENT)
Dept: FAMILY MEDICINE CLINIC | Facility: CLINIC | Age: 53
End: 2018-01-08

## 2018-01-08 VITALS
WEIGHT: 239 LBS | SYSTOLIC BLOOD PRESSURE: 132 MMHG | DIASTOLIC BLOOD PRESSURE: 78 MMHG | HEIGHT: 68 IN | TEMPERATURE: 99 F | OXYGEN SATURATION: 98 % | HEART RATE: 92 BPM | BODY MASS INDEX: 36.22 KG/M2

## 2018-01-08 DIAGNOSIS — E11.9 TYPE 2 DIABETES MELLITUS WITHOUT COMPLICATION, WITHOUT LONG-TERM CURRENT USE OF INSULIN (HCC): Primary | ICD-10-CM

## 2018-01-08 DIAGNOSIS — E11.9 ENCOUNTER FOR COMPREHENSIVE DIABETIC FOOT EXAMINATION, TYPE 2 DIABETES MELLITUS (HCC): ICD-10-CM

## 2018-01-08 DIAGNOSIS — I10 ESSENTIAL HYPERTENSION: ICD-10-CM

## 2018-01-08 PROCEDURE — 99213 OFFICE O/P EST LOW 20 MIN: CPT | Performed by: FAMILY MEDICINE

## 2018-01-08 RX ORDER — LISINOPRIL 10 MG/1
10 TABLET ORAL DAILY
Qty: 90 TABLET | Refills: 1 | Status: SHIPPED | OUTPATIENT
Start: 2018-01-08 | End: 2018-04-09 | Stop reason: SDUPTHER

## 2018-01-08 NOTE — PROGRESS NOTES
Subjective   Chief Complaint   Patient presents with   • Diabetes     diabetic foot exam     Ceferino Pereira is a 52 y.o. male.   Diabetes (diabetic foot exam)    History of Present Illness     Diabetes   He presents for his follow-up diabetic visit. He has type 2 diabetes mellitus. His disease course has been stable. There are no hypoglycemic associated symptoms. Pertinent negatives for hypoglycemia include no dizziness or headaches. Associated symptoms include foot paresthesias. Pertinent negatives for diabetes include no blurred vision, no chest pain, no fatigue, no polydipsia, no polyphagia, no polyuria and no visual change. There are no hypoglycemic complications. Symptoms are stable. There are no diabetic complications. Pertinent negatives for diabetic complications include no peripheral neuropathy or retinopathy. Risk factors for coronary artery disease include diabetes mellitus, obesity and sedentary lifestyle. Current diabetic treatment includes diet. He is compliant with treatment all of the time. His weight is variable. He is following a diabetic diet. Meal planning includes avoidance of concentrated sweets. He has not had a previous visit with a dietitian. He never participates in exercise. He monitors blood glucose at home 3-4 x per week. Blood glucose monitoring compliance is excellent. There is no change in his home blood glucose trend. His breakfast blood glucose range is generally 110-130 mg/dl. He does not see a podiatrist. Eye exam is current.   Due for diabetic foot exam this month.    Obesity - remains uncontrolled, weight is variable    The following portions of the patient's history were reviewed and updated as appropriate: allergies, current medications, past family history, past medical history, past social history, past surgical history and problem list.    Review of Systems   Constitutional: Negative for appetite change, chills, fatigue and fever.   HENT: Negative for congestion, ear  "pain, rhinorrhea and sore throat.    Eyes: Negative for pain.   Respiratory: Negative for cough and shortness of breath.    Cardiovascular: Negative for chest pain and palpitations.   Gastrointestinal: Negative for abdominal pain, constipation, diarrhea and nausea.   Genitourinary: Negative for dysuria.   Musculoskeletal: Negative for back pain, joint swelling and neck pain.   Skin: Negative for rash.   Neurological: Negative for dizziness and headaches.       Objective   /78  Pulse 92  Temp 99 °F (37.2 °C)  Ht 172.7 cm (67.99\")  Wt 108 kg (239 lb)  SpO2 98%  BMI 36.35 kg/m2  Physical Exam   Constitutional: He is oriented to person, place, and time. He appears well-developed and well-nourished.   HENT:   Head: Normocephalic and atraumatic.   Eyes: Pupils are equal, round, and reactive to light.   Neck: Normal range of motion. Neck supple.   Cardiovascular: Normal rate, regular rhythm and normal heart sounds.    Pulmonary/Chest: Effort normal and breath sounds normal. No respiratory distress. He has no wheezes. He has no rales.   Abdominal: Soft. Bowel sounds are normal.   Musculoskeletal: Normal range of motion.    Ceferino had a diabetic foot exam performed today.   During the foot exam he had a monofilament test performed (10/10 bilateral).    Neurological Sensory Findings -  Unaltered sharp/dull right ankle/foot discrimination and unaltered sharp/dull left ankle/foot discrimination. No right ankle/foot altered proprioception and no left ankle/foot altered proprioception    Vascular Status -  His exam exhibits right foot vasculature normal. His exam exhibits no right foot edema. His exam exhibits left foot vasculature normal. His exam exhibits no left foot edema.   Skin Integrity  -  His right foot skin is intact.  He has callous right foot.  He has no right foot onychomycosis, no right foot ulcer,  no ingrown toenail on right foot, right heel is not dry and cracked, no right foot warmth, no right foot " blister and no right foot gangrenous changes.    Ceferino 's left foot skin is intact. He has callous left foot. He has no left foot onychomycosis, no left foot ulcer, no left ingrown toenail, no left heel dry and cracked, no left foot warmth, no left foot blister and no left foot gangrenous changes..  Neurological: He is alert and oriented to person, place, and time.   Skin: Skin is warm and dry.   Psychiatric: He has a normal mood and affect.   Nursing note and vitals reviewed.      Assessment/Plan   Problems Addressed this Visit        Endocrine    Type 2 diabetes mellitus without complication, without long-term current use of insulin - Primary    Relevant Orders    MicroAlbumin, Urine, Random - Urine, Clean Catch      Other Visit Diagnoses     Encounter for comprehensive diabetic foot examination, type 2 diabetes mellitus        Essential hypertension        Relevant Medications    lisinopril (PRINIVIL,ZESTRIL) 10 MG tablet        Adjusted lisinopril    Diabetic foot exam done today    Medical release for diabetic eye exam - [facility]    Urine ordered    Recheck in 6 months

## 2018-04-09 DIAGNOSIS — I10 ESSENTIAL HYPERTENSION: ICD-10-CM

## 2018-04-09 RX ORDER — LISINOPRIL 10 MG/1
10 TABLET ORAL DAILY
Qty: 90 TABLET | Refills: 1 | Status: SHIPPED | OUTPATIENT
Start: 2018-04-09 | End: 2018-04-09 | Stop reason: SDUPTHER

## 2018-04-09 RX ORDER — LISINOPRIL 10 MG/1
10 TABLET ORAL DAILY
Qty: 90 TABLET | Refills: 1 | Status: SHIPPED | OUTPATIENT
Start: 2018-04-09 | End: 2018-10-03 | Stop reason: SDUPTHER

## 2018-04-23 ENCOUNTER — TRANSCRIBE ORDERS (OUTPATIENT)
Dept: GENERAL RADIOLOGY | Facility: CLINIC | Age: 53
End: 2018-04-23

## 2018-04-23 DIAGNOSIS — M25.532 LEFT WRIST PAIN: Primary | ICD-10-CM

## 2018-06-27 ENCOUNTER — OFFICE VISIT (OUTPATIENT)
Dept: FAMILY MEDICINE CLINIC | Facility: CLINIC | Age: 53
End: 2018-06-27

## 2018-06-27 VITALS
BODY MASS INDEX: 35.83 KG/M2 | SYSTOLIC BLOOD PRESSURE: 120 MMHG | HEIGHT: 68 IN | HEART RATE: 74 BPM | TEMPERATURE: 97.8 F | WEIGHT: 236.4 LBS | OXYGEN SATURATION: 98 % | DIASTOLIC BLOOD PRESSURE: 80 MMHG

## 2018-06-27 DIAGNOSIS — E66.09 CLASS 2 OBESITY DUE TO EXCESS CALORIES WITHOUT SERIOUS COMORBIDITY WITH BODY MASS INDEX (BMI) OF 35.0 TO 35.9 IN ADULT: ICD-10-CM

## 2018-06-27 DIAGNOSIS — Z23 NEED FOR SHINGLES VACCINE: ICD-10-CM

## 2018-06-27 DIAGNOSIS — I10 ESSENTIAL HYPERTENSION: ICD-10-CM

## 2018-06-27 DIAGNOSIS — M77.8 TENDONITIS OF ELBOW, RIGHT: ICD-10-CM

## 2018-06-27 DIAGNOSIS — E11.9 TYPE 2 DIABETES MELLITUS WITHOUT COMPLICATION, WITHOUT LONG-TERM CURRENT USE OF INSULIN (HCC): Primary | ICD-10-CM

## 2018-06-27 PROCEDURE — 99214 OFFICE O/P EST MOD 30 MIN: CPT | Performed by: FAMILY MEDICINE

## 2018-06-27 RX ORDER — NABUMETONE 500 MG/1
500 TABLET, FILM COATED ORAL 2 TIMES DAILY PRN
Qty: 28 TABLET | Refills: 0 | Status: SHIPPED | OUTPATIENT
Start: 2018-06-27 | End: 2018-10-03

## 2018-06-27 RX ORDER — LISINOPRIL 10 MG/1
10 TABLET ORAL DAILY
Qty: 90 TABLET | Refills: 1 | Status: CANCELLED | OUTPATIENT
Start: 2018-06-27

## 2018-06-27 NOTE — PROGRESS NOTES
Subjective   Chief Complaint   Patient presents with   • Diabetes     5 months   • Med Refill   • Elbow Pain     right, about 2 weeks     Ceferino Pereira is a 52 y.o. male.   Diabetes (5 months); Med Refill; and Elbow Pain (right, about 2 weeks)    History of Present Illness     Diabetes   He presents for his follow-up diabetic visit. He has type 2 diabetes mellitus. His disease course has been stable. There are no hypoglycemic associated symptoms. Pertinent negatives for hypoglycemia include no dizziness or headaches. Associated symptoms include foot paresthesias. Pertinent negatives for diabetes include no blurred vision, no chest pain, no fatigue, no polydipsia, no polyphagia, no polyuria and no visual change. There are no hypoglycemic complications. Symptoms are stable. There are no diabetic complications. Pertinent negatives for diabetic complications include no peripheral neuropathy or retinopathy. Risk factors for coronary artery disease include diabetes mellitus, obesity and sedentary lifestyle. Current diabetic treatment includes diet. He is compliant with treatment all of the time. His weight is variable. He is following a diabetic diet. Meal planning includes avoidance of concentrated sweets. He has not had a previous visit with a dietitian. He never participates in exercise. He monitors blood glucose at home 3-4 x per week. Blood glucose monitoring compliance is excellent. There is no change in his home blood glucose trend. His breakfast blood glucose range is generally 100-110 mg/dl. He does not see a podiatrist. Eye exam is current. Diabetic foot exam is current.  Urine test is due.    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    Review of Systems   Constitutional: Negative for appetite change, chills, fatigue and fever.   HENT: Negative for congestion, ear pain, rhinorrhea and sore throat.    Eyes: Negative for pain.   Respiratory: Negative for cough and shortness of breath.    Cardiovascular: Negative for chest pain and palpitations.   Gastrointestinal: Negative for abdominal pain, constipation, diarrhea and nausea.   Genitourinary: Negative for dysuria.   Musculoskeletal: Positive for arthralgias. Negative for back pain, joint swelling and neck pain.   Skin: Negative for rash.   Neurological: Negative for dizziness and headaches.       Objective   /80   Pulse 74   Temp 97.8 °F (36.6 °C)   Ht 172.7 cm (67.99\")   Wt 107 kg (236 lb 6.4 oz)   SpO2 98%   BMI 35.95 kg/m²   Physical Exam   Constitutional: He is oriented to person, place, and time. He appears well-developed and well-nourished.   HENT:   Head: Normocephalic and atraumatic.   Eyes: Pupils are equal, round, and reactive to light.   Neck: Normal range of motion. Neck supple.   Cardiovascular: Normal rate, regular rhythm and normal heart sounds.    Pulmonary/Chest: Effort normal and breath sounds normal. No respiratory distress. He has no wheezes. He has no rales.   Abdominal: Soft. Bowel sounds are normal.   Musculoskeletal:        Right elbow: He exhibits decreased range of motion. Tenderness found. Olecranon process tenderness noted.   Neurological: He is alert and oriented to person, place, and time.   Skin: Skin is warm and dry.   Psychiatric: He has a normal mood and affect.   Nursing note and vitals reviewed.      Assessment/Plan   Problems Addressed this Visit        Digestive    Class 2 obesity due to excess calories without serious comorbidity with body mass index " (BMI) of 35.0 to 35.9 in adult       Endocrine    Type 2 diabetes mellitus without complication, without long-term current use of insulin - Primary    Relevant Orders    MicroAlbumin, Urine, Random - Urine, Clean Catch      Other Visit Diagnoses     Essential hypertension        Tendonitis of elbow, right        Need for shingles vaccine            Script for shingles vaccine    Patient's Body mass index is 35.95 kg/m². BMI is above normal parameters. Recommendations include: exercise counseling and nutrition counseling.    Urine microalbumine ordered    Educational handout  Recommended NSAID x 14 days at max, ice, elevation, compression brace    Recheck in 3 months

## 2018-06-27 NOTE — PATIENT INSTRUCTIONS
Tendinitis  Tendinitis is inflammation of a tendon. A tendon is a strong cord of tissue that connects muscle to bone. Tendinitis can affect any tendon, but it most commonly affects the shoulder tendon (rotator cuff), ankle tendon (Achilles tendon), elbow tendon (triceps tendon), or one of the tendons in the wrist.  What are the causes?  This condition may be caused by:  · Overusing a tendon or muscle. This is common.  · Age-related wear and tear.  · Injury.  · Inflammatory conditions, such as arthritis.  · Certain medicines.    What increases the risk?  This condition is more likely to develop in people who do activities that involve repetitive motions.  What are the signs or symptoms?  Symptoms of this condition may include:  · Pain.  · Tenderness.  · Mild swelling.    How is this diagnosed?  This condition is diagnosed with a physical exam. You may also have tests, such as:  · Ultrasound. This uses sound waves to make an image of your affected area.  · MRI.    How is this treated?  This condition may be treated by resting, icing, applying pressure (compression), and raising (elevating) the area above the level of your heart. This is known as RICE therapy. Treatment may also include:  · Medicines to help reduce inflammation or to help reduce pain.  · Exercises or physical therapy to strengthen and stretch the tendon.  · A brace or splint.  · Surgery (rare).    Follow these instructions at home:    If you have a splint or brace:  · Wear the splint or brace as told by your health care provider. Remove it only as told by your health care provider.  · Loosen the splint or brace if your fingers or toes tingle, become numb, or turn cold and blue.  · Do not take baths, swim, or use a hot tub until your health care provider approves. Ask your health care provider if you can take showers. You may only be allowed to take sponge baths for bathing.  · Do not let your splint or brace get wet if it is not waterproof.  ? If your  splint or brace is not waterproof, cover it with a watertight plastic bag when you take a bath or a shower.  · Keep the splint or brace clean.  Managing pain, stiffness, and swelling  · If directed, apply ice to the affected area.  ? Put ice in a plastic bag.  ? Place a towel between your skin and the bag.  ? Leave the ice on for 20 minutes, 2-3 times a day.  · If directed, apply heat to the affected area as often as told by your health care provider. Use the heat source that your health care provider recommends, such as a moist heat pack or a heating pad.  ? Place a towel between your skin and the heat source.  ? Leave the heat on for 20-30 minutes.  ? Remove the heat if your skin turns bright red. This is especially important if you are unable to feel pain, heat, or cold. You may have a greater risk of getting burned.  · Move the fingers or toes of the affected limb often, if this applies. This can help to prevent stiffness and lessen swelling.  · If directed, elevate the affected area above the level of your heart while you are sitting or lying down.  Driving  · Do not drive or operate heavy machinery while taking prescription pain medicine.  · Ask your health care provider when it is safe to drive if you have a splint or brace on any part of your arm or leg.  Activity  · Return to your normal activities as told by your health care provider. Ask your health care provider what activities are safe for you.  · Rest the affected area as told by your health care provider.  · Avoid using the affected area while you are experiencing symptoms of tendinitis.  · Do exercises as told by your health care provider.  General instructions  · If you have a splint, do not put pressure on any part of the splint until it is fully hardened. This may take several hours.  · Wear an elastic bandage or compression wrap only as told by your health care provider.  · Take over-the-counter and prescription medicines only as told by your  health care provider.  · Keep all follow-up visits as told by your health care provider. This is important.  Contact a health care provider if:  · Your symptoms do not improve.  · You develop new, unexplained problems, such as numbness in your hands.  This information is not intended to replace advice given to you by your health care provider. Make sure you discuss any questions you have with your health care provider.  Document Released: 12/15/2001 Document Revised: 08/17/2017 Document Reviewed: 09/19/2016  [employer]vier Interactive Patient Education © 2018 [employer]

## 2018-07-02 LAB — ALBUMIN UR-MCNC: 0.8 MG/L

## 2018-07-02 PROCEDURE — 82043 UR ALBUMIN QUANTITATIVE: CPT | Performed by: FAMILY MEDICINE

## 2018-10-03 ENCOUNTER — OFFICE VISIT (OUTPATIENT)
Dept: FAMILY MEDICINE CLINIC | Facility: CLINIC | Age: 53
End: 2018-10-03

## 2018-10-03 VITALS
WEIGHT: 236.2 LBS | HEIGHT: 68 IN | HEART RATE: 76 BPM | TEMPERATURE: 98.3 F | DIASTOLIC BLOOD PRESSURE: 70 MMHG | BODY MASS INDEX: 35.8 KG/M2 | SYSTOLIC BLOOD PRESSURE: 124 MMHG | OXYGEN SATURATION: 98 %

## 2018-10-03 DIAGNOSIS — E66.09 CLASS 2 OBESITY DUE TO EXCESS CALORIES WITHOUT SERIOUS COMORBIDITY WITH BODY MASS INDEX (BMI) OF 35.0 TO 35.9 IN ADULT: ICD-10-CM

## 2018-10-03 DIAGNOSIS — I10 ESSENTIAL HYPERTENSION: ICD-10-CM

## 2018-10-03 DIAGNOSIS — Z23 INFLUENZA VACCINE NEEDED: ICD-10-CM

## 2018-10-03 DIAGNOSIS — E11.9 TYPE 2 DIABETES MELLITUS WITHOUT COMPLICATION, WITHOUT LONG-TERM CURRENT USE OF INSULIN (HCC): Primary | ICD-10-CM

## 2018-10-03 PROCEDURE — 99214 OFFICE O/P EST MOD 30 MIN: CPT | Performed by: FAMILY MEDICINE

## 2018-10-03 PROCEDURE — 90674 CCIIV4 VAC NO PRSV 0.5 ML IM: CPT | Performed by: FAMILY MEDICINE

## 2018-10-03 PROCEDURE — 90471 IMMUNIZATION ADMIN: CPT | Performed by: FAMILY MEDICINE

## 2018-10-03 RX ORDER — LISINOPRIL 10 MG/1
10 TABLET ORAL DAILY
Qty: 90 TABLET | Refills: 1 | Status: SHIPPED | OUTPATIENT
Start: 2018-10-03 | End: 2019-03-31 | Stop reason: SDUPTHER

## 2018-10-03 NOTE — PROGRESS NOTES
Subjective   Chief Complaint   Patient presents with   • Diabetes     3 months   • Hypertension     Ceferino Pereira is a 53 y.o. male.   Diabetes (3 months) and Hypertension    History of Present Illness     Diabetes   He presents for his follow-up diabetic visit. He has type 2 diabetes mellitus. His disease course has been stable. There are no hypoglycemic associated symptoms. Pertinent negatives for hypoglycemia include no dizziness or headaches. Associated symptoms include foot paresthesias. Pertinent negatives for diabetes include no blurred vision, no chest pain, no fatigue, no polydipsia, no polyphagia, no polyuria and no visual change. There are no hypoglycemic complications. Symptoms are stable. There are no diabetic complications. Pertinent negatives for diabetic complications include no peripheral neuropathy or retinopathy. Risk factors for coronary artery disease include diabetes mellitus, obesity and sedentary lifestyle. Current diabetic treatment includes diet. He is compliant with treatment all of the time. His weight is variable. He is following a diabetic diet. Meal planning includes avoidance of concentrated sweets. He has not had a previous visit with a dietitian. He never participates in exercise. He monitors blood glucose at home 3-4 x per week. Blood glucose monitoring compliance is excellent. There is no change in his home blood glucose trend. His breakfast blood glucose range is generally 100-110 mg/dl. He does not see a podiatrist. Eye exam is current. Diabetic foot exam is current.    The following portions of the patient's history were reviewed and updated as appropriate: allergies, current medications, past family history, past medical history, past social history, past surgical history and problem list.    Review of Systems   Constitutional: Negative for appetite change, chills, fatigue and fever.   HENT: Negative for congestion, ear pain, rhinorrhea and sore throat.    Eyes: Negative  "for pain.   Respiratory: Negative for cough and shortness of breath.    Cardiovascular: Negative for chest pain and palpitations.   Gastrointestinal: Negative for abdominal pain, constipation, diarrhea and nausea.   Genitourinary: Negative for dysuria.   Musculoskeletal: Negative for back pain, joint swelling and neck pain.   Skin: Negative for rash.   Neurological: Negative for dizziness and headaches.       Objective   /70   Pulse 76   Temp 98.3 °F (36.8 °C)   Ht 172.7 cm (67.99\")   Wt 107 kg (236 lb 3.2 oz)   SpO2 98%   BMI 35.92 kg/m²   Physical Exam   Constitutional: He is oriented to person, place, and time. He appears well-developed and well-nourished.   HENT:   Head: Normocephalic and atraumatic.   Eyes: Pupils are equal, round, and reactive to light.   Neck: Normal range of motion. Neck supple.   Cardiovascular: Normal rate, regular rhythm and normal heart sounds.    Pulmonary/Chest: Effort normal and breath sounds normal. No respiratory distress. He has no wheezes. He has no rales.   Abdominal: Soft. Bowel sounds are normal.   Musculoskeletal: Normal range of motion.   Neurological: He is alert and oriented to person, place, and time.   Skin: Skin is warm and dry.   Psychiatric: He has a normal mood and affect.   Nursing note and vitals reviewed.      Assessment/Plan   Problems Addressed this Visit        Digestive    Class 2 obesity due to excess calories without serious comorbidity with body mass index (BMI) of 35.0 to 35.9 in adult       Endocrine    Type 2 diabetes mellitus without complication, without long-term current use of insulin (CMS/[facility]) - Primary      Other Visit Diagnoses     Essential hypertension        Relevant Medications    lisinopril (PRINIVIL,ZESTRIL) 10 MG tablet    Influenza vaccine needed        Relevant Orders    Flucelvax Quad=>4Years ([id number])        Recommended shingles and flu vaccine    Flu vaccine today    Refilled lisinopril    Patient's Body mass index is " 35.92 kg/m². BMI is above normal parameters. Recommendations include: exercise counseling and nutrition counseling.    Recheck in 3 months for diabetic lab work and foot exam

## 2018-11-05 ENCOUNTER — OFFICE VISIT (OUTPATIENT)
Dept: SLEEP MEDICINE | Facility: HOSPITAL | Age: 53
End: 2018-11-05

## 2018-11-05 VITALS
HEART RATE: 81 BPM | SYSTOLIC BLOOD PRESSURE: 127 MMHG | BODY MASS INDEX: 36.6 KG/M2 | DIASTOLIC BLOOD PRESSURE: 68 MMHG | HEIGHT: 68 IN | WEIGHT: 241.5 LBS | OXYGEN SATURATION: 98 %

## 2018-11-05 DIAGNOSIS — G47.33 OBSTRUCTIVE SLEEP APNEA, ADULT: Primary | ICD-10-CM

## 2018-11-05 PROCEDURE — 99213 OFFICE O/P EST LOW 20 MIN: CPT | Performed by: INTERNAL MEDICINE

## 2018-11-05 NOTE — PROGRESS NOTES
Sleep Clinic Follow Up    Date: 2018  Primary Care Physician: Kisha Tomlin MD    Last office visit: 2017 (I reviewed this note)    CC: Follow up: LAVELL    Sleep Testin. PSG on 10/24/2015, AHI of 95   2. CPAP titration recommended 14 cm H2O   3. Currently on 15 cm H2O      Assessment and Plan:    1. Obstructive sleep apnea Established, stable (1)  1. Compliant and improved with PAP therapy  2. Continue PAP as prescribed.   3. Script for PAP supplies  4. CPAP liners      Interim History (-3/4):  Since the last visit:    1) severe LAVELL -  Ceferino Pereira has remained compliant with CPAP. He denies machine issues, dry mouth, headaches, or pressures intolerance. He denies abnormal dreams, sleep paralysis, nasal congestion, URI sx. He has more mask leak than last year, but switched to [place].    PAP Data:    Time frame: 2017 - 2018   Compliance 95.6 %  Average use on days used: 6hrs 56 min  Percent of days with usage greater than or equal to 4 hours: 91.2%  PAP range : 15 cm H2O  Average 90% pressure: 15 cmH2O  Leak: 78 minutes  Average AHI 4.5 events/hr  Mask type: Nasal mask with chinstrap  DME: [facility]    Bed time: 2100  Sleep latency: 10-20 minutes  Number of times awakens during the night: 2-3  Wake time: 0500  Estimated total sleep time at night: 6-7 hours  Caffeine intake: 12oz of coffee, 4oz of tea, and 20oz of soda  Alcohol intake: none drinks per week  Nap time: none   Sleepiness with Driving: none    2) Patient denies RLS symptoms.     PMHx, FH, SH reviewed and pertinent changes are: unchanged from last office visit on 2017      REVIEW OF SYSTEMS:   Negative for chest pain, fever, cough, SOA, abdominal pain. Smoking:none    Exam (-):  Vitals:    18 1624   BP: 127/68   Pulse: 81   SpO2: 98%     Body mass index is 36.73 kg/m². Patient's Body mass index is 36.73 kg/m². BMI is above normal parameters. Recommendations include: referral to primary  care.      Gen:  No acute distress, alert, oriented  Lungs:  CTA with normal effort   CV:  RRR, no M/R/G  GI:  soft, non-tender  Psych:  Appropriate affect      Past Medical History:   Diagnosis Date   • Aching leg syndrome    • Acute bronchitis    • Acute maxillary sinusitis    • Adult BMI 30+     obesity   • Ankle joint pain    • Arthritis    • Bacterial infectious disease    • Blood in feces    • BMI 37.0-37.9, adult    • Carpal tunnel syndrome    • Chronic pharyngitis    • Contact dermatitis    • Derangement of posterior horn of medial meniscus due to old tear or injury, unspecified knee     Old tear of posterior horn of medial meniscus    • Derangement of unspecified medial meniscus due to old tear or injury, left knee    • Diabetes mellitus (CMS/[facility])    • Diabetes mellitus screening    • Fatigue    • Gastroesophageal reflux disease    • General medical exam    • Health maintenance examination     individual health exam   • Infected insect bite    • Knee pain    • Nausea and vomiting    • Need for immunization against influenza    • LAVELL on CPAP    • Osteoarthritis    • Osteoarthritis of knee    • Plantar fasciitis    • Radicular pain of lumbosacral region     Pain radiating to lumbar region of back     • Screening for hyperlipidemia    • Shoulder pain     left shoulder   • Thyroid disorder screening    • Upper respiratory infection    • Urticaria        Current Outpatient Prescriptions:   •  lisinopril (PRINIVIL,ZESTRIL) 10 MG tablet, Take 1 tablet by mouth Daily., Disp: 90 tablet, Rfl: 1    Total time 15 min, more than half spent in face to face counseling and coordination of care.    RTC in 12 months     This document has been electronically signed by Art Cornell MD on November 5, 2018         CC: Kisha Tomlin MD          No ref. provider found

## 2019-01-10 DIAGNOSIS — E11.9 TYPE 2 DIABETES MELLITUS WITHOUT COMPLICATION, WITHOUT LONG-TERM CURRENT USE OF INSULIN (HCC): Primary | ICD-10-CM

## 2019-01-21 ENCOUNTER — LAB (OUTPATIENT)
Dept: LAB | Facility: HOSPITAL | Age: 54
End: 2019-01-21

## 2019-01-21 ENCOUNTER — OFFICE VISIT (OUTPATIENT)
Dept: FAMILY MEDICINE CLINIC | Facility: CLINIC | Age: 54
End: 2019-01-21

## 2019-01-21 VITALS
WEIGHT: 242 LBS | BODY MASS INDEX: 36.68 KG/M2 | DIASTOLIC BLOOD PRESSURE: 76 MMHG | HEIGHT: 68 IN | OXYGEN SATURATION: 97 % | HEART RATE: 80 BPM | RESPIRATION RATE: 16 BRPM | SYSTOLIC BLOOD PRESSURE: 120 MMHG | TEMPERATURE: 98 F

## 2019-01-21 DIAGNOSIS — I10 ESSENTIAL HYPERTENSION: ICD-10-CM

## 2019-01-21 DIAGNOSIS — E11.9 TYPE 2 DIABETES MELLITUS WITHOUT COMPLICATION, WITHOUT LONG-TERM CURRENT USE OF INSULIN (HCC): Primary | ICD-10-CM

## 2019-01-21 DIAGNOSIS — E66.09 CLASS 2 OBESITY DUE TO EXCESS CALORIES WITHOUT SERIOUS COMORBIDITY WITH BODY MASS INDEX (BMI) OF 36.0 TO 36.9 IN ADULT: ICD-10-CM

## 2019-01-21 DIAGNOSIS — E11.9 TYPE 2 DIABETES MELLITUS WITHOUT COMPLICATION, WITHOUT LONG-TERM CURRENT USE OF INSULIN (HCC): ICD-10-CM

## 2019-01-21 DIAGNOSIS — E11.9 ENCOUNTER FOR COMPREHENSIVE DIABETIC FOOT EXAMINATION, TYPE 2 DIABETES MELLITUS (HCC): ICD-10-CM

## 2019-01-21 LAB
ALBUMIN SERPL-MCNC: 4.6 G/DL (ref 3.4–4.8)
ALBUMIN/GLOB SERPL: 1.8 G/DL (ref 1.1–1.8)
ALP SERPL-CCNC: 75 U/L (ref 38–126)
ALT SERPL W P-5'-P-CCNC: 26 U/L (ref 21–72)
ANION GAP SERPL CALCULATED.3IONS-SCNC: 7 MMOL/L (ref 5–15)
AST SERPL-CCNC: 48 U/L (ref 17–59)
BASOPHILS # BLD AUTO: 0.05 10*3/MM3 (ref 0–0.2)
BASOPHILS NFR BLD AUTO: 0.5 % (ref 0–2)
BILIRUB SERPL-MCNC: 0.5 MG/DL (ref 0.2–1.3)
BUN BLD-MCNC: 11 MG/DL (ref 7–21)
BUN/CREAT SERPL: 13.8 (ref 7–25)
CALCIUM SPEC-SCNC: 10.1 MG/DL (ref 8.4–10.2)
CHLORIDE SERPL-SCNC: 100 MMOL/L (ref 95–110)
CO2 SERPL-SCNC: 29 MMOL/L (ref 22–31)
CREAT BLD-MCNC: 0.8 MG/DL (ref 0.7–1.3)
DEPRECATED RDW RBC AUTO: 39.6 FL (ref 35.1–43.9)
EOSINOPHIL # BLD AUTO: 0.32 10*3/MM3 (ref 0–0.7)
EOSINOPHIL NFR BLD AUTO: 3 % (ref 0–7)
ERYTHROCYTE [DISTWIDTH] IN BLOOD BY AUTOMATED COUNT: 12.8 % (ref 11.5–14.5)
GFR SERPL CREATININE-BSD FRML MDRD: 101 ML/MIN/1.73 (ref 56–130)
GLOBULIN UR ELPH-MCNC: 2.6 GM/DL (ref 2.3–3.5)
GLUCOSE BLD-MCNC: 146 MG/DL (ref 60–100)
HBA1C MFR BLD: 6.7 % (ref 4–5.6)
HCT VFR BLD AUTO: 46.2 % (ref 39–49)
HGB BLD-MCNC: 15.9 G/DL (ref 13.7–17.3)
IMM GRANULOCYTES # BLD AUTO: 0.02 10*3/MM3 (ref 0–0.02)
IMM GRANULOCYTES NFR BLD AUTO: 0.2 % (ref 0–0.5)
LYMPHOCYTES # BLD AUTO: 4.86 10*3/MM3 (ref 0.6–4.2)
LYMPHOCYTES NFR BLD AUTO: 45.2 % (ref 10–50)
MCH RBC QN AUTO: 29.2 PG (ref 26.5–34)
MCHC RBC AUTO-ENTMCNC: 34.4 G/DL (ref 31.5–36.3)
MCV RBC AUTO: 84.8 FL (ref 80–98)
MONOCYTES # BLD AUTO: 0.67 10*3/MM3 (ref 0–0.9)
MONOCYTES NFR BLD AUTO: 6.2 % (ref 0–12)
NEUTROPHILS # BLD AUTO: 4.84 10*3/MM3 (ref 2–8.6)
NEUTROPHILS NFR BLD AUTO: 44.9 % (ref 37–80)
PLATELET # BLD AUTO: 305 10*3/MM3 (ref 150–450)
PMV BLD AUTO: 10.4 FL (ref 8–12)
POTASSIUM BLD-SCNC: 4.7 MMOL/L (ref 3.5–5.1)
PROT SERPL-MCNC: 7.2 G/DL (ref 6.3–8.6)
RBC # BLD AUTO: 5.45 10*6/MM3 (ref 4.37–5.74)
SODIUM BLD-SCNC: 136 MMOL/L (ref 137–145)
WBC NRBC COR # BLD: 10.76 10*3/MM3 (ref 3.2–9.8)

## 2019-01-21 PROCEDURE — 83036 HEMOGLOBIN GLYCOSYLATED A1C: CPT

## 2019-01-21 PROCEDURE — 36415 COLL VENOUS BLD VENIPUNCTURE: CPT

## 2019-01-21 PROCEDURE — 99213 OFFICE O/P EST LOW 20 MIN: CPT | Performed by: FAMILY MEDICINE

## 2019-01-21 PROCEDURE — 85025 COMPLETE CBC W/AUTO DIFF WBC: CPT

## 2019-01-21 PROCEDURE — 80053 COMPREHEN METABOLIC PANEL: CPT

## 2019-01-21 NOTE — PROGRESS NOTES
Subjective   Chief Complaint   Patient presents with   • Foot Problem     here for DM foot exam      Ceferino Pereira is a 53 y.o. male.   Foot Problem (here for DM foot exam )    History of Present Illness     Diabetes   He presents for his follow-up diabetic visit. He has type 2 diabetes mellitus. His disease course has been stable. There are no hypoglycemic associated symptoms. Pertinent negatives for hypoglycemia include no dizziness or headaches. Associated symptoms include foot paresthesias. Pertinent negatives for diabetes include no blurred vision, no chest pain, no fatigue, no polydipsia, no polyphagia, no polyuria and no visual change. There are no hypoglycemic complications. Symptoms are stable. There are no diabetic complications. Pertinent negatives for diabetic complications include no peripheral neuropathy or retinopathy. Risk factors for coronary artery disease include diabetes mellitus, obesity and sedentary lifestyle. Current diabetic treatment includes diet. He is compliant with treatment all of the time. His weight is variable. He is following a diabetic diet. Meal planning includes avoidance of concentrated sweets. He has not had a previous visit with a dietitian. He never participates in exercise. He monitors blood glucose at home 3-4 x per week. Blood glucose monitoring compliance is excellent. There is no change in his home blood glucose trend. His breakfast blood glucose range is generally 100-110 mg/dl. He does not see a podiatrist. Eye exam is current. Diabetic foot exam is due today.  Admits to eating more lately and not following a restricted diet.  He has also been under a lot of stress lately due to family issues with his son.  Diabetic lab work done today.    The following portions of the patient's history were reviewed and updated as appropriate: allergies, current medications, past family history, past medical history, past social history, past surgical history and problem  "list.    Review of Systems   Constitutional: Negative for appetite change, chills, fatigue and fever.   HENT: Negative for congestion, ear pain, rhinorrhea and sore throat.    Eyes: Negative for pain.   Respiratory: Negative for cough and shortness of breath.    Cardiovascular: Negative for chest pain and palpitations.   Gastrointestinal: Negative for abdominal pain, constipation, diarrhea and nausea.   Genitourinary: Negative for dysuria.   Musculoskeletal: Negative for back pain, joint swelling and neck pain.   Skin: Negative for rash.   Neurological: Negative for dizziness and headaches.       Objective   /76   Pulse 80   Temp 98 °F (36.7 °C) (Temporal)   Resp 16   Ht 172.7 cm (68\")   Wt 110 kg (242 lb)   SpO2 97%   BMI 36.80 kg/m²   Physical Exam   Constitutional: He is oriented to person, place, and time. He appears well-developed and well-nourished.   HENT:   Head: Normocephalic and atraumatic.   Eyes: Pupils are equal, round, and reactive to light.   Neck: Normal range of motion. Neck supple.   Cardiovascular: Normal rate, regular rhythm and normal heart sounds.   Pulmonary/Chest: Effort normal and breath sounds normal. No respiratory distress. He has no wheezes. He has no rales.   Abdominal: Soft. Bowel sounds are normal.   Musculoskeletal: Normal range of motion.    Ceferino had a diabetic foot exam performed today.   During the foot exam he had a monofilament test performed (10/10 bilateral).    Neurological Sensory Findings -  Unaltered sharp/dull right ankle/foot discrimination and unaltered sharp/dull left ankle/foot discrimination. No right ankle/foot altered proprioception and no left ankle/foot altered proprioception  Vascular Status -  His right foot exhibits normal foot vasculature  and no edema. His left foot exhibits normal foot vasculature  and no edema.  Skin Integrity  -  His right foot skin is intact.  He has no right foot onychomycosis, no right foot ulcer, non-callous right foot, " no ingrown toenail on right foot, right heel is not dry and cracked, no right foot warmth, no right foot blister and no right foot gangrenous changes.His left foot skin is intact. He has no left foot onychomycosis, no left foot ulcer, non-callous left foot, no left ingrown toenail, no left heel dry and cracked, no left foot warmth, no left foot blister and no left foot gangrenous changes..  Neurological: He is alert and oriented to person, place, and time. He has normal reflexes. No cranial nerve deficit or sensory deficit.   Skin: Skin is warm and dry.   Psychiatric: He has a normal mood and affect.   Nursing note and vitals reviewed.      Assessment/Plan   Problems Addressed this Visit        Cardiovascular and Mediastinum    Essential hypertension       Digestive    Class 2 obesity due to excess calories without serious comorbidity with body mass index (BMI) of 36.0 to 36.9 in adult       Endocrine    Type 2 diabetes mellitus without complication, without long-term current use of insulin (CMS/[facility]) - Primary      Other Visit Diagnoses     Encounter for comprehensive diabetic foot examination, type 2 diabetes mellitus (CMS/[facility])            Diabetic foot exam done today    Medical release signed for diabetic eye exam    Patient's Body mass index is 36.8 kg/m². BMI is above normal parameters. Recommendations include: exercise counseling and nutrition counseling.    Reviewed lab results with patient    Recheck in 6 months

## 2019-04-01 DIAGNOSIS — I10 ESSENTIAL HYPERTENSION: ICD-10-CM

## 2019-04-01 RX ORDER — LISINOPRIL 10 MG/1
TABLET ORAL
Qty: 90 TABLET | Refills: 1 | Status: SHIPPED | OUTPATIENT
Start: 2019-04-01 | End: 2019-10-24 | Stop reason: SDUPTHER

## 2019-06-20 ENCOUNTER — OFFICE VISIT (OUTPATIENT)
Dept: FAMILY MEDICINE CLINIC | Facility: CLINIC | Age: 54
End: 2019-06-20

## 2019-06-20 VITALS
DIASTOLIC BLOOD PRESSURE: 72 MMHG | WEIGHT: 232 LBS | TEMPERATURE: 97.1 F | HEART RATE: 69 BPM | OXYGEN SATURATION: 98 % | HEIGHT: 68 IN | BODY MASS INDEX: 35.16 KG/M2 | SYSTOLIC BLOOD PRESSURE: 120 MMHG

## 2019-06-20 DIAGNOSIS — M79.89 BILATERAL HAND SWELLING: ICD-10-CM

## 2019-06-20 DIAGNOSIS — M25.50 MULTIPLE JOINT PAIN: ICD-10-CM

## 2019-06-20 DIAGNOSIS — I10 ESSENTIAL HYPERTENSION: ICD-10-CM

## 2019-06-20 DIAGNOSIS — E11.9 TYPE 2 DIABETES MELLITUS WITHOUT COMPLICATION, WITHOUT LONG-TERM CURRENT USE OF INSULIN (HCC): Primary | ICD-10-CM

## 2019-06-20 DIAGNOSIS — E66.09 CLASS 2 OBESITY DUE TO EXCESS CALORIES WITHOUT SERIOUS COMORBIDITY WITH BODY MASS INDEX (BMI) OF 35.0 TO 35.9 IN ADULT: ICD-10-CM

## 2019-06-20 DIAGNOSIS — Z82.61 FAMILY HISTORY OF RHEUMATOID ARTHRITIS: ICD-10-CM

## 2019-06-20 PROCEDURE — 99214 OFFICE O/P EST MOD 30 MIN: CPT | Performed by: FAMILY MEDICINE

## 2019-06-20 RX ORDER — NABUMETONE 500 MG/1
500 TABLET, FILM COATED ORAL 2 TIMES DAILY PRN
Qty: 60 TABLET | Refills: 2 | Status: SHIPPED | OUTPATIENT
Start: 2019-06-20 | End: 2019-06-20 | Stop reason: SDUPTHER

## 2019-06-20 RX ORDER — NABUMETONE 500 MG/1
500 TABLET, FILM COATED ORAL 2 TIMES DAILY PRN
Qty: 60 TABLET | Refills: 2 | Status: SHIPPED | OUTPATIENT
Start: 2019-06-20 | End: 2019-07-24

## 2019-06-20 NOTE — PROGRESS NOTES
Subjective   Chief Complaint   Patient presents with   • Diabetes   • Edema     hands     Ceferino Pereira is a 53 y.o. male.   Diabetes and Edema (hands)    History of Present Illness     Diabetes   He presents for his follow-up diabetic visit. He has type 2 diabetes mellitus. His disease course has been stable. There are no hypoglycemic associated symptoms. Pertinent negatives for hypoglycemia include no dizziness or headaches. Associated symptoms include foot paresthesias. Pertinent negatives for diabetes include no blurred vision, no chest pain, no fatigue, no polydipsia, no polyphagia, no polyuria and no visual change. There are no hypoglycemic complications. Symptoms are stable. There are no diabetic complications. Pertinent negatives for diabetic complications include no peripheral neuropathy or retinopathy. Risk factors for coronary artery disease include diabetes mellitus, obesity and sedentary lifestyle. Current diabetic treatment includes diet. He is compliant with treatment all of the time. His weight is variable. He is following a diabetic diet. Meal planning includes avoidance of concentrated sweets. He has not had a previous visit with a dietitian. He never participates in exercise. He monitors blood glucose at home 3-4 x per week. Blood glucose monitoring compliance is excellent. There is no change in his home blood glucose trend. His breakfast blood glucose range is generally 100-110 mg/dl. He does not see a podiatrist. Eye exam is current. Diabetic foot exam is due today.  Admits to eating more lately and not following a restricted diet.  He has also been under a lot of stress lately due to family issues with his son.  Diabetic lab work done today.  Diabetic eye exam is current - done at [facility] in [place], [place] in January.    He has been having some issues with bilateral hand swelling and pain  Mother has rheumatoid arthritis  Associated with pain in both knees and feet  The swelling has been  "intermittent  Has only tried tylenol as needed to help with pain  The pain is much better controlled when limited activity    The following portions of the patient's history were reviewed and updated as appropriate: allergies, current medications, past family history, past medical history, past social history, past surgical history and problem list.    Review of Systems   Constitutional: Negative for appetite change, chills, fatigue and fever.   HENT: Negative for congestion, ear pain, rhinorrhea and sore throat.    Eyes: Negative for pain.   Respiratory: Negative for cough and shortness of breath.    Cardiovascular: Negative for chest pain and palpitations.   Gastrointestinal: Negative for abdominal pain, constipation, diarrhea and nausea.   Genitourinary: Negative for dysuria.   Musculoskeletal: Positive for arthralgias and joint swelling. Negative for back pain and neck pain.   Skin: Negative for rash.   Neurological: Negative for dizziness and headaches.       Objective   /72   Pulse 69   Temp 97.1 °F (36.2 °C) (Oral)   Ht 172.7 cm (68\")   Wt 105 kg (232 lb)   SpO2 98%   BMI 35.28 kg/m²   Physical Exam   Constitutional: He is oriented to person, place, and time. He appears well-developed and well-nourished.   HENT:   Head: Normocephalic and atraumatic.   Eyes: Pupils are equal, round, and reactive to light.   Neck: Normal range of motion. Neck supple.   Cardiovascular: Normal rate, regular rhythm and normal heart sounds.   Pulmonary/Chest: Effort normal and breath sounds normal. No respiratory distress. He has no wheezes. He has no rales.   Abdominal: Soft. Bowel sounds are normal.   Musculoskeletal:        Right hand: He exhibits decreased range of motion and swelling.        Left hand: He exhibits decreased range of motion and swelling.   Neurological: He is alert and oriented to person, place, and time.   Skin: Skin is warm and dry.   Psychiatric: He has a normal mood and affect.   Nursing note " and vitals reviewed.      Assessment/Plan   Problems Addressed this Visit        Cardiovascular and Mediastinum    Essential hypertension       Digestive    Class 2 obesity due to excess calories without serious comorbidity with body mass index (BMI) of 35.0 to 35.9 in adult       Endocrine    Type 2 diabetes mellitus without complication, without long-term current use of insulin (CMS/[facility]) - Primary    Relevant Orders    MicroAlbumin, Urine, Random - Urine, Clean Catch    Comprehensive Metabolic Panel    CBC & Differential    Hemoglobin A1c      Other Visit Diagnoses     Multiple joint pain        Relevant Medications    nabumetone (RELAFEN) 500 MG tablet    Other Relevant Orders    PRASAD    Rheumatoid Factor    C-reactive protein    Sedimentation Rate    Uric acid    Family history of rheumatoid arthritis        Bilateral hand swelling        Relevant Medications    nabumetone (RELAFEN) 500 MG tablet        Diabetic lab work ordered    Will call for diabetic eye examination records from [facility]    Start relafen for probable osteoarthritis  Lab work ordered for family history of rheumatoid arthritis  If labs are positive will refer patient to rheumatologist    Patient's Body mass index is 35.28 kg/m². BMI is above normal parameters. Recommendations include: exercise counseling and nutrition counseling.    Recheck as needed  Will refer patient and his wife to a new primary care provider to establish care in the next 3 months

## 2019-06-22 ENCOUNTER — LAB (OUTPATIENT)
Dept: LAB | Facility: HOSPITAL | Age: 54
End: 2019-06-22

## 2019-06-22 DIAGNOSIS — E11.9 TYPE 2 DIABETES MELLITUS WITHOUT COMPLICATION, WITHOUT LONG-TERM CURRENT USE OF INSULIN (HCC): ICD-10-CM

## 2019-06-22 DIAGNOSIS — M25.50 MULTIPLE JOINT PAIN: ICD-10-CM

## 2019-06-22 LAB
ALBUMIN SERPL-MCNC: 4.5 G/DL (ref 3.5–5.2)
ALBUMIN UR-MCNC: <1.2 MG/L
ALBUMIN/GLOB SERPL: 1.6 G/DL
ALP SERPL-CCNC: 81 U/L (ref 39–117)
ALT SERPL W P-5'-P-CCNC: 16 U/L (ref 1–41)
ANION GAP SERPL CALCULATED.3IONS-SCNC: 11 MMOL/L
AST SERPL-CCNC: 17 U/L (ref 1–40)
BASOPHILS # BLD AUTO: 0.09 10*3/MM3 (ref 0–0.2)
BASOPHILS NFR BLD AUTO: 0.9 % (ref 0–1.5)
BILIRUB SERPL-MCNC: 0.3 MG/DL (ref 0.2–1.2)
BUN BLD-MCNC: 14 MG/DL (ref 6–20)
BUN/CREAT SERPL: 17.5 (ref 7–25)
CALCIUM SPEC-SCNC: 9.4 MG/DL (ref 8.6–10.5)
CHLORIDE SERPL-SCNC: 101 MMOL/L (ref 98–107)
CHROMATIN AB SERPL-ACNC: <10 IU/ML (ref 0–14)
CO2 SERPL-SCNC: 28 MMOL/L (ref 22–29)
CREAT BLD-MCNC: 0.8 MG/DL (ref 0.76–1.27)
CRP SERPL-MCNC: 0.18 MG/DL (ref 0–0.5)
DEPRECATED RDW RBC AUTO: 39.8 FL (ref 37–54)
EOSINOPHIL # BLD AUTO: 0.25 10*3/MM3 (ref 0–0.4)
EOSINOPHIL NFR BLD AUTO: 2.6 % (ref 0.3–6.2)
ERYTHROCYTE [DISTWIDTH] IN BLOOD BY AUTOMATED COUNT: 12.7 % (ref 12.3–15.4)
ERYTHROCYTE [SEDIMENTATION RATE] IN BLOOD: 4 MM/HR (ref 0–20)
GFR SERPL CREATININE-BSD FRML MDRD: 101 ML/MIN/1.73
GLOBULIN UR ELPH-MCNC: 2.8 GM/DL
GLUCOSE BLD-MCNC: 135 MG/DL (ref 65–99)
HBA1C MFR BLD: 6.2 % (ref 4.8–5.6)
HCT VFR BLD AUTO: 46.8 % (ref 37.5–51)
HGB BLD-MCNC: 15.4 G/DL (ref 13–17.7)
IMM GRANULOCYTES # BLD AUTO: 0.02 10*3/MM3 (ref 0–0.05)
IMM GRANULOCYTES NFR BLD AUTO: 0.2 % (ref 0–0.5)
LYMPHOCYTES # BLD AUTO: 4.35 10*3/MM3 (ref 0.7–3.1)
LYMPHOCYTES NFR BLD AUTO: 45.6 % (ref 19.6–45.3)
MCH RBC QN AUTO: 28.1 PG (ref 26.6–33)
MCHC RBC AUTO-ENTMCNC: 32.9 G/DL (ref 31.5–35.7)
MCV RBC AUTO: 85.4 FL (ref 79–97)
MONOCYTES # BLD AUTO: 0.58 10*3/MM3 (ref 0.1–0.9)
MONOCYTES NFR BLD AUTO: 6.1 % (ref 5–12)
NEUTROPHILS # BLD AUTO: 4.25 10*3/MM3 (ref 1.7–7)
NEUTROPHILS NFR BLD AUTO: 44.6 % (ref 42.7–76)
NRBC BLD AUTO-RTO: 0 /100 WBC (ref 0–0.2)
PLATELET # BLD AUTO: 298 10*3/MM3 (ref 140–450)
PMV BLD AUTO: 10.8 FL (ref 6–12)
POTASSIUM BLD-SCNC: 4.4 MMOL/L (ref 3.5–5.2)
PROT SERPL-MCNC: 7.3 G/DL (ref 6–8.5)
RBC # BLD AUTO: 5.48 10*6/MM3 (ref 4.14–5.8)
SODIUM BLD-SCNC: 140 MMOL/L (ref 136–145)
URATE SERPL-MCNC: 7.1 MG/DL (ref 3.4–7)
WBC NRBC COR # BLD: 9.54 10*3/MM3 (ref 3.4–10.8)

## 2019-06-22 PROCEDURE — 85025 COMPLETE CBC W/AUTO DIFF WBC: CPT

## 2019-06-22 PROCEDURE — 82043 UR ALBUMIN QUANTITATIVE: CPT | Performed by: FAMILY MEDICINE

## 2019-06-22 PROCEDURE — 85652 RBC SED RATE AUTOMATED: CPT | Performed by: FAMILY MEDICINE

## 2019-06-22 PROCEDURE — 86431 RHEUMATOID FACTOR QUANT: CPT | Performed by: FAMILY MEDICINE

## 2019-06-22 PROCEDURE — 84550 ASSAY OF BLOOD/URIC ACID: CPT

## 2019-06-22 PROCEDURE — 83036 HEMOGLOBIN GLYCOSYLATED A1C: CPT

## 2019-06-22 PROCEDURE — 86140 C-REACTIVE PROTEIN: CPT

## 2019-06-22 PROCEDURE — 86038 ANTINUCLEAR ANTIBODIES: CPT

## 2019-06-22 PROCEDURE — 80053 COMPREHEN METABOLIC PANEL: CPT

## 2019-06-22 PROCEDURE — 36415 COLL VENOUS BLD VENIPUNCTURE: CPT

## 2019-06-24 LAB — ANA SER QL: NEGATIVE

## 2019-07-24 ENCOUNTER — OFFICE VISIT (OUTPATIENT)
Dept: FAMILY MEDICINE CLINIC | Facility: CLINIC | Age: 54
End: 2019-07-24

## 2019-07-24 VITALS
TEMPERATURE: 97.4 F | DIASTOLIC BLOOD PRESSURE: 78 MMHG | SYSTOLIC BLOOD PRESSURE: 116 MMHG | BODY MASS INDEX: 35.77 KG/M2 | WEIGHT: 236 LBS | OXYGEN SATURATION: 98 % | HEIGHT: 68 IN | HEART RATE: 64 BPM

## 2019-07-24 DIAGNOSIS — I10 ESSENTIAL HYPERTENSION: ICD-10-CM

## 2019-07-24 DIAGNOSIS — E66.09 CLASS 2 OBESITY DUE TO EXCESS CALORIES WITHOUT SERIOUS COMORBIDITY WITH BODY MASS INDEX (BMI) OF 35.0 TO 35.9 IN ADULT: ICD-10-CM

## 2019-07-24 DIAGNOSIS — E11.9 TYPE 2 DIABETES MELLITUS WITHOUT COMPLICATION, WITHOUT LONG-TERM CURRENT USE OF INSULIN (HCC): ICD-10-CM

## 2019-07-24 DIAGNOSIS — E79.0 HYPERURICEMIA: Primary | ICD-10-CM

## 2019-07-24 PROCEDURE — 99214 OFFICE O/P EST MOD 30 MIN: CPT | Performed by: FAMILY MEDICINE

## 2019-07-24 RX ORDER — ALLOPURINOL 100 MG/1
100 TABLET ORAL DAILY
Qty: 90 TABLET | Refills: 0 | Status: SHIPPED | OUTPATIENT
Start: 2019-07-24 | End: 2019-07-24 | Stop reason: SDUPTHER

## 2019-07-24 RX ORDER — ALLOPURINOL 100 MG/1
100 TABLET ORAL DAILY
Qty: 14 TABLET | Refills: 0 | Status: SHIPPED | OUTPATIENT
Start: 2019-07-24 | End: 2019-10-24 | Stop reason: SDUPTHER

## 2019-07-24 NOTE — PROGRESS NOTES
Subjective   Chief Complaint   Patient presents with   • Hand Pain     new med f/u      Ceferino Pereira is a 54 y.o. male.   Hand Pain (new med f/u )    History of Present Illness     Presents today for a recheck on joint pain    Diabetes   He presents for his follow-up diabetic visit. He has type 2 diabetes mellitus. His disease course has been stable. There are no hypoglycemic associated symptoms. Pertinent negatives for hypoglycemia include no dizziness or headaches. Associated symptoms include foot paresthesias. Pertinent negatives for diabetes include no blurred vision, no chest pain, no fatigue, no polydipsia, no polyphagia, no polyuria and no visual change. There are no hypoglycemic complications. Symptoms are stable. There are no diabetic complications. Pertinent negatives for diabetic complications include no peripheral neuropathy or retinopathy. Risk factors for coronary artery disease include diabetes mellitus, obesity and sedentary lifestyle. Current diabetic treatment includes diet. He is compliant with treatment all of the time. His weight is variable. He is following a diabetic diet. Meal planning includes avoidance of concentrated sweets. He has not had a previous visit with a dietitian. He never participates in exercise. He monitors blood glucose at home 3-4 x per week. Blood glucose monitoring compliance is excellent. There is no change in his home blood glucose trend. His breakfast blood glucose range is generally 100-110 mg/dl. He does not see a podiatrist. Eye exam is current. Diabetic foot exam is due today.  Admits to eating more lately and not following a restricted diet.  He has also been under a lot of stress lately due to family issues with his son.  Diabetic lab work done.  Diabetic eye exam is current - done at [facility] in [place], [place] in January.    Bilateral hand swelling and pain - lab work was performed for further evaluation  He was found to have an elevated uric acid level  Pain  "rated 2/10 today - he has taken aleve today  Denies improvement with tylenol, relafen or aleve    The following portions of the patient's history were reviewed and updated as appropriate: allergies, current medications, past family history, past medical history, past social history, past surgical history and problem list.    Review of Systems   Constitutional: Negative for appetite change, chills, fatigue and fever.   HENT: Negative for congestion, ear pain, rhinorrhea and sore throat.    Eyes: Negative for pain.   Respiratory: Negative for cough and shortness of breath.    Cardiovascular: Negative for chest pain and palpitations.   Gastrointestinal: Negative for abdominal pain, constipation, diarrhea and nausea.   Genitourinary: Negative for dysuria.   Musculoskeletal: Positive for arthralgias. Negative for back pain, joint swelling and neck pain.   Skin: Negative for rash.   Neurological: Negative for dizziness and headaches.       Objective   /78   Pulse 64   Temp 97.4 °F (36.3 °C) (Oral)   Ht 172.7 cm (68\")   Wt 107 kg (236 lb)   SpO2 98%   BMI 35.88 kg/m²   Physical Exam   Constitutional: He is oriented to person, place, and time. He appears well-developed and well-nourished.   HENT:   Head: Normocephalic and atraumatic.   Eyes: Pupils are equal, round, and reactive to light.   Neck: Normal range of motion. Neck supple.   Cardiovascular: Normal rate, regular rhythm and normal heart sounds.   Pulmonary/Chest: Effort normal and breath sounds normal. No respiratory distress. He has no wheezes. He has no rales.   Abdominal: Soft. Bowel sounds are normal.   Musculoskeletal: Normal range of motion.   Neurological: He is alert and oriented to person, place, and time.   Skin: Skin is warm and dry.   Psychiatric: He has a normal mood and affect.   Nursing note and vitals reviewed.      Assessment/Plan   Problems Addressed this Visit        Cardiovascular and Mediastinum    Essential hypertension       " Digestive    Class 2 obesity due to excess calories without serious comorbidity with body mass index (BMI) of 35.0 to 35.9 in adult       Endocrine    Type 2 diabetes mellitus without complication, without long-term current use of insulin (CMS/[facility])       Other    Hyperuricemia - Primary        Reviewed lab results with patient    Diabetes - well controlled and at goal with diet alone    Uric acid level elevated  Start allopurinol daily  90 day supply sent to mail order  14 day supply sent to [facility] to start today  Educational handout provided on gout/ hyperuricemia  Repeat lab work in 3 months    Referred to Dr Gutierrez, Dr Leblanc for primary care  Recommended to establish care in 1-3 months to follow up and recheck on diabetes

## 2019-07-24 NOTE — PATIENT INSTRUCTIONS
Gout  Gout is painful swelling that can occur in some of your joints. Gout is a type of arthritis. This condition is caused by having too much uric acid in your body. Uric acid is a chemical that forms when your body breaks down substances called purines. Purines are important for building body proteins.  When your body has too much uric acid, sharp crystals can form and build up inside your joints. This causes pain and swelling. Gout attacks can happen quickly and be very painful (acute gout). Over time, the attacks can affect more joints and become more frequent (chronic gout). Gout can also cause uric acid to build up under your skin and inside your kidneys.  What are the causes?  This condition is caused by too much uric acid in your blood. This can occur because:  · Your kidneys do not remove enough uric acid from your blood. This is the most common cause.  · Your body makes too much uric acid. This can occur with some cancers and cancer treatments. It can also occur if your body is breaking down too many red blood cells (hemolytic anemia).  · You eat too many foods that are high in purines. These foods include organ meats and some seafood. Alcohol, especially beer, is also high in purines.    A gout attack may be triggered by trauma or stress.  What increases the risk?  This condition is more likely to develop in people who:  · Have a family history of gout.  · Are male and middle-aged.  · Are female and have gone through menopause.  · Are obese.  · Frequently drink alcohol, especially beer.  · Are dehydrated.  · Lose weight too quickly.  · Have an organ transplant.  · Have lead poisoning.  · Take certain medicines, including aspirin, cyclosporine, diuretics, levodopa, and niacin.  · Have kidney disease or psoriasis.    What are the signs or symptoms?  An attack of acute gout happens quickly. It usually occurs in just one joint. The most common place is the big toe. Attacks often start at night. Other joints  that may be affected include joints of the feet, ankle, knee, fingers, wrist, or elbow. Symptoms may include:  · Severe pain.  · Warmth.  · Swelling.  · Stiffness.  · Tenderness. The affected joint may be very painful to touch.  · Shiny, red, or purple skin.  · Chills and fever.    Chronic gout may cause symptoms more frequently. More joints may be involved. You may also have white or yellow lumps (tophi) on your hands or feet or in other areas near your joints.  How is this diagnosed?  This condition is diagnosed based on your symptoms, medical history, and physical exam. You may have tests, such as:  · Blood tests to measure uric acid levels.  · Removal of joint fluid with a needle (aspiration) to look for uric acid crystals.  · X-rays to look for joint damage.    How is this treated?  Treatment for this condition has two phases: treating an acute attack and preventing future attacks. Acute gout treatment may include medicines to reduce pain and swelling, including:  · NSAIDs.  · Steroids. These are strong anti-inflammatory medicines that can be taken by mouth (orally) or injected into a joint.  · Colchicine. This medicine relieves pain and swelling when it is taken soon after an attack. It can be given orally or through an IV tube.    Preventive treatment may include:  · Daily use of smaller doses of NSAIDs or colchicine.  · Use of a medicine that reduces uric acid levels in your blood.  · Changes to your diet. You may need to see a specialist about healthy eating (dietitian).    Follow these instructions at home:  During a Gout Attack  · If directed, apply ice to the affected area:  ? Put ice in a plastic bag.  ? Place a towel between your skin and the bag.  ? Leave the ice on for 20 minutes, 2-3 times a day.  · Rest the joint as much as possible. If the affected joint is in your leg, you may be given crutches to use.  · Raise (elevate) the affected joint above the level of your heart as often as  possible.  · Drink enough fluids to keep your urine pale yellow.  · Take over-the-counter and prescription medicines only as told by your health care provider.  · Do not drive or operate heavy machinery while taking prescription pain medicine.  · Follow instructions from your health care provider about eating or drinking restrictions.  · Return to your normal activities as told by your health care provider. Ask your health care provider what activities are safe for you.  Avoiding Future Gout Attacks  · Follow a low-purine diet as told by your dietitian or health care provider. Avoid foods and drinks that are high in purines, including liver, kidney, anchovies, asparagus, herring, mushrooms, mussels, and beer.  · Limit alcohol intake to no more than 1 drink a day for nonpregnant women and 2 drinks a day for men. One drink equals 12 oz of beer, 5 oz of wine, or 1½ oz of hard liquor.  · Maintain a healthy weight or lose weight if you are overweight. If you want to lose weight, talk with your health care provider. It is important that you do not lose weight too quickly.  · Start or maintain an exercise program as told by your health care provider.  · Drink enough fluids to keep your urine pale yellow.  · Take over-the-counter and prescription medicines only as told by your health care provider.  · Keep all follow-up visits as told by your health care provider. This is important.  Contact a health care provider if:  · You have another gout attack.  · You continue to have symptoms of a gout attack after10 days of treatment.  · You have side effects from your medicines.  · You have chills or a fever.  · You have burning pain when you urinate.  · You have pain in your lower back or belly.  Get help right away if:  · You have severe or uncontrolled pain.  · You cannot urinate.  This information is not intended to replace advice given to you by your health care provider. Make sure you discuss any questions you have with your  health care provider.  Document Released: 12/15/2001 Document Revised: 09/11/2018 Document Reviewed: 09/29/2016  [employer] Interactive Patient Education © 2019 [employer]

## 2019-08-21 ENCOUNTER — OFFICE VISIT (OUTPATIENT)
Dept: FAMILY MEDICINE CLINIC | Facility: CLINIC | Age: 54
End: 2019-08-21

## 2019-08-21 VITALS
HEIGHT: 68 IN | HEART RATE: 75 BPM | SYSTOLIC BLOOD PRESSURE: 100 MMHG | TEMPERATURE: 98.8 F | WEIGHT: 230 LBS | BODY MASS INDEX: 34.86 KG/M2 | DIASTOLIC BLOOD PRESSURE: 70 MMHG | OXYGEN SATURATION: 97 %

## 2019-08-21 DIAGNOSIS — E66.09 CLASS 2 OBESITY DUE TO EXCESS CALORIES WITHOUT SERIOUS COMORBIDITY WITH BODY MASS INDEX (BMI) OF 35.0 TO 35.9 IN ADULT: ICD-10-CM

## 2019-08-21 DIAGNOSIS — G47.33 OSA ON CPAP: ICD-10-CM

## 2019-08-21 DIAGNOSIS — Z99.89 OSA ON CPAP: ICD-10-CM

## 2019-08-21 DIAGNOSIS — R73.03 PREDIABETES: Primary | ICD-10-CM

## 2019-08-21 PROCEDURE — 99213 OFFICE O/P EST LOW 20 MIN: CPT | Performed by: FAMILY MEDICINE

## 2019-08-21 NOTE — PROGRESS NOTES
Subjective:     Ceferino Pereira is a 54 y.o. male who presents for prediabetes.     The patient was initially diagnosed with prediabetes during screening. Reports with exercise/weight loss and healthy diet he has been able to reduce his A1C.  He has not required any diabetic medications. Patient reports daily exercise (walking).  Weight has been steadily decreasing.  Notes recent changes to his vision that are improved by the use of corrective lenses.    Patient also has LAVELL and uses CPAP.  Reports good compliance with his CPAP and improvement in daytime sleepiness.  Follows with Dr. Cornell regularly for LAVELL.      The following portions of the patient's history were reviewed and updated as appropriate: allergies, current medications, past family history, past medical history, past social history, past surgical history and problem list.    Preventative:  Over the past 2 weeks, have you felt down, depressed, or hopeless?No   Over the past 2 weeks, have you felt little interest or pleasure in doing things?No    On osteoporosis therapy?Not Indicated     Past Medical Hx:  Past Medical History:   Diagnosis Date   • Aching leg syndrome    • Arthritis    • Carpal tunnel syndrome    • Chronic pharyngitis    • Derangement of posterior horn of medial meniscus due to old tear or injury, unspecified knee     Old tear of posterior horn of medial meniscus    • Derangement of unspecified medial meniscus due to old tear or injury, left knee    • Diabetes mellitus (CMS/HCC)    • Gastroesophageal reflux disease    • Obesity    • LAVELL on CPAP    • Osteoarthritis of knee    • Plantar fasciitis    • Radicular pain of lumbosacral region     Pain radiating to lumbar region of back     • Urticaria        Past Surgical Hx:  Past Surgical History:   Procedure Laterality Date   • COLONOSCOPY N/A 3/3/2017    Procedure: COLONOSCOPY;  Surgeon: Delmar Ward MD;  Location: [facility] ENDOSCOPY;  Service:    • FOOT SURGERY Right 2016    infection  top of foot   • HAND SURGERY         Health Maintenance:  Health Maintenance   Topic Date Due   • ANNUAL PHYSICAL  07/03/1968   • DIABETIC EYE EXAM  01/09/2019   • INFLUENZA VACCINE  08/01/2019   • HEMOGLOBIN A1C  12/22/2019   • DIABETIC FOOT EXAM  01/21/2020   • URINE MICROALBUMIN  06/22/2020   • TDAP/TD VACCINES (2 - Td) 09/09/2023   • COLONOSCOPY  03/03/2027   • PNEUMOCOCCAL VACCINE (19-64 MEDIUM RISK)  Completed   • HEPATITIS C SCREENING  Completed   • ZOSTER VACCINE  Discontinued       Current Meds:    Current Outpatient Medications:   •  allopurinol (ZYLOPRIM) 100 MG tablet, Take 1 tablet by mouth Daily., Disp: 14 tablet, Rfl: 0  •  lisinopril (PRINIVIL,ZESTRIL) 10 MG tablet, TAKE 1 TABLET DAILY, Disp: 90 tablet, Rfl: 1    Allergies:  Allergies   Allergen Reactions   • Penicillins      Family Hx:  Family History   Problem Relation Age of Onset   • Heart disease Mother    • Asthma Mother    • Diabetes Mother    • Arthritis Mother    • Heart disease Father    • Hypertension Father    • Heart disease Sister    • Hypertension Sister    • Diabetes Sister    • Heart disease Brother    • Hypertension Brother    • Stroke Brother    • Diabetes Brother    • Clotting disorder Brother         Social History:  Social History     Socioeconomic History   • Marital status:      Spouse name: Not on file   • Number of children: Not on file   • Years of education: Not on file   • Highest education level: Not on file   Tobacco Use   • Smoking status: Never Smoker   • Smokeless tobacco: Never Used   Substance and Sexual Activity   • Alcohol use: No   • Drug use: No   • Sexual activity: Defer       Review of Systems  Review of Systems   Constitutional: Negative for activity change, appetite change, chills, diaphoresis and fatigue.   HENT: Negative for congestion and sore throat.    Eyes: Negative for photophobia and visual disturbance.   Respiratory: Negative for cough, chest tightness, shortness of breath, wheezing and  "stridor.    Cardiovascular: Negative for chest pain, palpitations and leg swelling.   Gastrointestinal: Negative for abdominal pain, constipation, diarrhea, nausea and vomiting.   Genitourinary: Negative for decreased urine volume, difficulty urinating and urgency.   Musculoskeletal: Negative for arthralgias and myalgias.   Skin: Negative for color change and rash.   Neurological: Negative for weakness, light-headedness and headaches.   Psychiatric/Behavioral: Negative for decreased concentration and sleep disturbance.   All other systems reviewed and are negative.      Objective:     /70 (BP Location: Right arm)   Pulse 75   Temp 98.8 °F (37.1 °C)   Ht 172.7 cm (67.99\")   Wt 104 kg (230 lb)   SpO2 97%   BMI 34.98 kg/m²   Physical Exam   Constitutional: He is oriented to person, place, and time. He appears well-developed and well-nourished. No distress.   HENT:   Head: Normocephalic and atraumatic.   Nose: Nose normal.   Mouth/Throat: Oropharynx is clear and moist.   Eyes: Conjunctivae, EOM and lids are normal. Pupils are equal, round, and reactive to light.   Wears glasses   Neck: Normal range of motion.   Cardiovascular: Normal rate, regular rhythm, normal heart sounds and intact distal pulses.   No murmur heard.  Pulmonary/Chest: Effort normal and breath sounds normal. No respiratory distress. He has no wheezes.   Abdominal: Soft. Bowel sounds are normal. He exhibits no distension. There is no tenderness.   Musculoskeletal: Normal range of motion.   Neurological: He is alert and oriented to person, place, and time.   Skin: Skin is warm. Capillary refill takes less than 2 seconds. He is not diaphoretic.   Psychiatric: He has a normal mood and affect. His behavior is normal.     Lab Review  Hemoglobin A1C (%)   Date Value   06/22/2019 6.20 (H)   01/21/2019 6.7 (H)   01/03/2018 6.1 (H)   09/05/2017 6.4 (H)     Lab Results   Component Value Date    GLUCOSE 135 (H) 06/22/2019    BUN 14 06/22/2019    " CREATININE 0.80 06/22/2019    EGFRIFNONA 101 06/22/2019    BCR 17.5 06/22/2019    K 4.4 06/22/2019    CO2 28.0 06/22/2019    CALCIUM 9.4 06/22/2019    ALBUMIN 4.50 06/22/2019    AST 17 06/22/2019    ALT 16 06/22/2019     Lab Results   Component Value Date    CHLPL 111 (L) 08/21/2015    TRIG 148 08/21/2015    HDL 27.0 (L) 08/21/2015    LDL 54 08/21/2015    LDL 65.0 08/21/2015       Assessment/Plan:     Problem List Items Addressed This Visit        Respiratory    LAVELL on CPAP       Digestive    Class 2 obesity due to excess calories without serious comorbidity with body mass index (BMI) of 35.0 to 35.9 in adult    Overview     - diet modification and exercise            Other    Prediabetes - Primary    Overview     Lab Results   Component Value Date    HGBA1C 6.20 (H) 06/22/2019                    Follow UP:     Return in about 3 months (around 11/21/2019) for Follow up Gout.    Goals        Patient Stated    • Weight (lb) < 90.7 kg (200 lb) (pt-stated)           Preventative:  Male Preventative: Colon cancer screening is up to date  Recommended Vaccines:none    Nonsmoker  does not drink   Patient's Body mass index is 34.98 kg/m². BMI is above normal parameters. Recommendations include: exercise counseling and nutrition counseling  increase physical activity, reduce portion size, cut out extra servings and reduce fast food intake    RISK SCORE: 3     Signature  [place] [address]  Office: [phone]    This document has been electronically signed by Elvia Leblanc MD on August 24, 2019 8:50 AM

## 2019-08-24 PROBLEM — R73.03 PREDIABETES: Status: ACTIVE | Noted: 2019-08-24

## 2019-08-24 PROBLEM — I10 ESSENTIAL HYPERTENSION: Status: RESOLVED | Noted: 2017-01-27 | Resolved: 2019-08-24

## 2019-09-28 DIAGNOSIS — I10 ESSENTIAL HYPERTENSION: ICD-10-CM

## 2019-09-30 RX ORDER — LISINOPRIL 10 MG/1
TABLET ORAL
Qty: 90 TABLET | Refills: 4 | OUTPATIENT
Start: 2019-09-30

## 2019-10-22 ENCOUNTER — TELEPHONE (OUTPATIENT)
Dept: FAMILY MEDICINE CLINIC | Facility: CLINIC | Age: 54
End: 2019-10-22

## 2019-10-22 ENCOUNTER — OFFICE VISIT (OUTPATIENT)
Dept: FAMILY MEDICINE CLINIC | Facility: CLINIC | Age: 54
End: 2019-10-22

## 2019-10-22 VITALS
OXYGEN SATURATION: 98 % | SYSTOLIC BLOOD PRESSURE: 128 MMHG | HEIGHT: 68 IN | HEART RATE: 66 BPM | TEMPERATURE: 98.5 F | DIASTOLIC BLOOD PRESSURE: 68 MMHG | WEIGHT: 234.38 LBS | BODY MASS INDEX: 35.52 KG/M2 | RESPIRATION RATE: 17 BRPM

## 2019-10-22 DIAGNOSIS — R21 RASH IN ADULT: Primary | ICD-10-CM

## 2019-10-22 PROCEDURE — 99213 OFFICE O/P EST LOW 20 MIN: CPT | Performed by: STUDENT IN AN ORGANIZED HEALTH CARE EDUCATION/TRAINING PROGRAM

## 2019-10-22 NOTE — TELEPHONE ENCOUNTER
Patients wife called asking about the medication that provider was going to send in for patient.  They said they still have not received it at the pharmacy and wanted to check on it.     ThanksJessie

## 2019-10-24 DIAGNOSIS — I10 ESSENTIAL HYPERTENSION: ICD-10-CM

## 2019-10-24 RX ORDER — LISINOPRIL 10 MG/1
10 TABLET ORAL DAILY
Qty: 90 TABLET | Refills: 1 | Status: SHIPPED | OUTPATIENT
Start: 2019-10-24 | End: 2020-08-20 | Stop reason: SDUPTHER

## 2019-10-24 RX ORDER — ALLOPURINOL 100 MG/1
100 TABLET ORAL DAILY
Qty: 90 TABLET | Refills: 1 | OUTPATIENT
Start: 2019-10-24 | End: 2020-10-14

## 2019-10-24 NOTE — TELEPHONE ENCOUNTER
I have refilled the 2 medications on file for this patient as I am not sure which medication they are referring to.  [facility].  If they need a Rx asap they will need to let me know so I can send it to a local pharmacy.

## 2019-10-29 DIAGNOSIS — L25.9 CONTACT DERMATITIS, UNSPECIFIED CONTACT DERMATITIS TYPE, UNSPECIFIED TRIGGER: Primary | ICD-10-CM

## 2019-10-29 RX ORDER — DESONIDE 0.5 MG/G
CREAM TOPICAL 2 TIMES DAILY
Qty: 1 EACH | Refills: 0 | OUTPATIENT
Start: 2019-10-29 | End: 2020-10-14

## 2019-11-06 ENCOUNTER — OFFICE VISIT (OUTPATIENT)
Dept: SLEEP MEDICINE | Facility: HOSPITAL | Age: 54
End: 2019-11-06

## 2019-11-06 VITALS
BODY MASS INDEX: 35.61 KG/M2 | HEART RATE: 68 BPM | SYSTOLIC BLOOD PRESSURE: 120 MMHG | DIASTOLIC BLOOD PRESSURE: 74 MMHG | OXYGEN SATURATION: 96 % | WEIGHT: 235 LBS | HEIGHT: 68 IN

## 2019-11-06 DIAGNOSIS — G47.33 OBSTRUCTIVE SLEEP APNEA, ADULT: Primary | ICD-10-CM

## 2019-11-06 PROCEDURE — 99213 OFFICE O/P EST LOW 20 MIN: CPT | Performed by: NURSE PRACTITIONER

## 2019-11-06 NOTE — PROGRESS NOTES
"Sleep Clinic Follow Up    Date: 2019  Primary Care Physician: Elvia Leblanc MD    Last office visit: 2018 (I reviewed this note)    CC: Follow up: LAVELL on CPAP      Interim History:  Since the last visit:    1) severe LAVELL -  Ceferino Pereira has remained compliant with CPAP. He denies mask and machine issues, dry mouth, headaches, or pressures intolerance. He denies abnormal dreams, sleep paralysis, nasal congestion, URI sx.    Sleep Testin. PSG on 10/24/2015, AHI of 95   2. CPAP titration recommended 14 cm H2O   3. Currently on 15 cm H2O    PAP Data:    Time frame: 2018-2019   Compliance 99.5 %  Average use on days used: 6 hrs 39 min  Percent of days with usage greater than or equal to 4 hours: 92.1%  PAP range : 15 cm H2O  Average 90% pressure: 15 cmH2O  Leak: 1 hour 55 minutes  Average AHI 3.5 events/hr  Mask type: Full face mask  DME: [facility]    Bed time: 2130  Sleep latency: <5 minutes  Number of times awakens during the night: 0-1  Wake time: 0500  Estimated total sleep time at night: 7-7.5 hours  Caffeine intake: 0-2 cups of coffee, 0-1 cups of tea, and 1 sodas per day  Alcohol intake: 0 drinks per week  Nap time: none   Sleepiness with Driving: none       2) Patient denies RLS symptoms.     PMHx, FH, SH reviewed and pertinent changes are: \"wife does not snore now that she has her CPAP!\"       REVIEW OF SYSTEMS:   Negative for chest pain, SOA, fever, chills, cough, N/V/D, abdominal pain.    Smoking:none        Exam:  Vitals:    19 1516   BP: 120/74   Pulse: 68   SpO2: 96%           19  1516   Weight: 107 kg (235 lb)     Body mass index is 35.74 kg/m². Patient's Body mass index is 35.74 kg/m². BMI is above normal parameters. Recommendations include: referral to primary care.      Gen:                No distress, conversant, pleasant, appears stated age, alert, oriented  Eyes:               Anicteric sclera, moist conjunctiva, no lid lag                       "     PERRL, EOMI   Heent:             NC/AT                          Oropharynx clear                          Normal hearing  Lungs:             Normal effort, non-labored breathing                          Clear to auscultation bilaterally          CV:                  Normal S1/S2, no murmur                          No lower extremity edema  ABD:               Soft, rounded, non-distended                          Normal bowel sounds                    Psych:             Appropriate affect  Neuro:             CN 2-12 appear intact    Past Medical History:   Diagnosis Date   • Aching leg syndrome    • Arthritis    • Carpal tunnel syndrome    • Chronic pharyngitis    • Derangement of posterior horn of medial meniscus due to old tear or injury, unspecified knee     Old tear of posterior horn of medial meniscus    • Derangement of unspecified medial meniscus due to old tear or injury, left knee    • Diabetes mellitus (CMS/HCC)    • Gastroesophageal reflux disease    • Obesity    • LAVELL on CPAP    • Osteoarthritis of knee    • Plantar fasciitis    • Radicular pain of lumbosacral region     Pain radiating to lumbar region of back     • Urticaria        Current Outpatient Medications:   •  allopurinol (ZYLOPRIM) 100 MG tablet, Take 1 tablet by mouth Daily., Disp: 90 tablet, Rfl: 1  •  desonide (DESOWEN) 0.05 % cream, Apply  topically to the appropriate area as directed 2 (Two) Times a Day., Disp: 1 each, Rfl: 0  •  lisinopril (PRINIVIL,ZESTRIL) 10 MG tablet, Take 1 tablet by mouth Daily., Disp: 90 tablet, Rfl: 1      Assessment and Plan:    1. Obstructive sleep apnea stable chronic illness and Established, stable (1)  1. Compliant with PAP therapy  2. Continue PAP as prescribed  3. Script for PAP supplies  4. Return to clinic in 1 year with compliance report unless change in symptoms in interim period        8 of 15 minutes spent face-to-face counseling patient extensively regarding:   PAP therapy, PAP compliance and PAP  maintenance      RTC in 12 months. Patient agrees to return sooner if changes in symptoms.        This document has been electronically signed by PALOMA Ro on November 6, 2019 3:29 PM          CC: Elvia Leblanc MD          No ref. provider found

## 2019-11-15 NOTE — PROGRESS NOTES
I have reviewed the notes, assessments, and/or procedures performed by **Dr Vicente*during office visit. I concur with her/his documentation and assessment and plan for Ceferino Pereira.          This document has been electronically signed by Hussain Ryder MD on November 15, 2019 9:45 AM

## 2020-01-03 ENCOUNTER — TELEPHONE (OUTPATIENT)
Dept: FAMILY MEDICINE CLINIC | Facility: CLINIC | Age: 55
End: 2020-01-03

## 2020-01-03 NOTE — TELEPHONE ENCOUNTER
Pt's wife called and was wondering if Dr. Leblanc would like for him to do a uric acid test on him? Her number to call back is [phone].      Thank you,      Melvi

## 2020-01-11 NOTE — TELEPHONE ENCOUNTER
Pt was supposed to make a follow up visit with me in November 2019 to address his gout but never did.  If he wants to be evaluated for this have them make an appointment and then I will place lab orders.

## 2020-02-06 DIAGNOSIS — E11.9 TYPE 2 DIABETES MELLITUS WITHOUT COMPLICATION, WITHOUT LONG-TERM CURRENT USE OF INSULIN (HCC): Primary | ICD-10-CM

## 2020-02-06 DIAGNOSIS — E79.0 ELEVATED URIC ACID IN BLOOD: Primary | ICD-10-CM

## 2020-02-07 ENCOUNTER — LAB (OUTPATIENT)
Dept: LAB | Facility: HOSPITAL | Age: 55
End: 2020-02-07

## 2020-02-07 DIAGNOSIS — E11.9 TYPE 2 DIABETES MELLITUS WITHOUT COMPLICATION, WITHOUT LONG-TERM CURRENT USE OF INSULIN (HCC): ICD-10-CM

## 2020-02-07 DIAGNOSIS — E79.0 ELEVATED URIC ACID IN BLOOD: ICD-10-CM

## 2020-02-07 LAB
HBA1C MFR BLD: 6.78 % (ref 4.8–5.6)
URATE SERPL-MCNC: 5.8 MG/DL (ref 3.4–7)

## 2020-02-07 PROCEDURE — 84550 ASSAY OF BLOOD/URIC ACID: CPT

## 2020-02-07 PROCEDURE — 83036 HEMOGLOBIN GLYCOSYLATED A1C: CPT

## 2020-02-07 PROCEDURE — 36415 COLL VENOUS BLD VENIPUNCTURE: CPT

## 2020-08-20 ENCOUNTER — TELEPHONE (OUTPATIENT)
Dept: FAMILY MEDICINE CLINIC | Facility: CLINIC | Age: 55
End: 2020-08-20

## 2020-08-20 DIAGNOSIS — I10 ESSENTIAL HYPERTENSION: ICD-10-CM

## 2020-08-20 RX ORDER — LISINOPRIL 10 MG/1
10 TABLET ORAL DAILY
Qty: 90 TABLET | Refills: 3 | Status: SHIPPED | OUTPATIENT
Start: 2020-08-20 | End: 2021-07-22 | Stop reason: SDUPTHER

## 2020-08-20 NOTE — TELEPHONE ENCOUNTER
Patient needs refill for     lisinopril (PRINIVIL,ZESTRIL) 10 MG tablet     Please send to     [facility] - [place], [place] - [address] [place] [address] - [phone]  - [phone] FX Phone:  [phone] Fax:  [phone]    Address:  [address] [place] [id number]        Thank you     [phone]

## 2020-10-14 PROCEDURE — U0002 COVID-19 LAB TEST NON-CDC: HCPCS | Performed by: NURSE PRACTITIONER

## 2020-11-24 ENCOUNTER — OFFICE VISIT (OUTPATIENT)
Dept: FAMILY MEDICINE CLINIC | Facility: CLINIC | Age: 55
End: 2020-11-24

## 2020-11-24 VITALS
HEART RATE: 66 BPM | OXYGEN SATURATION: 97 % | DIASTOLIC BLOOD PRESSURE: 82 MMHG | BODY MASS INDEX: 36.1 KG/M2 | WEIGHT: 230 LBS | HEIGHT: 67 IN | SYSTOLIC BLOOD PRESSURE: 120 MMHG

## 2020-11-24 DIAGNOSIS — M62.08 DIASTASIS OF RECTUS ABDOMINIS: Primary | ICD-10-CM

## 2020-11-24 PROCEDURE — 99213 OFFICE O/P EST LOW 20 MIN: CPT | Performed by: STUDENT IN AN ORGANIZED HEALTH CARE EDUCATION/TRAINING PROGRAM

## 2020-11-24 NOTE — PROGRESS NOTES
"  Family Medicine Residency  Stalin Baker MD    Subjective:     Ceferino Pereira is a 55 y.o. male who presents for concerns about a hernia and \"right shoulder issues\".        Reported hernia: Patient states that he has noticed that the \"hernia\" has increased  in size and that it is more noticeable when he lays down.  Denies any pain.      Right sided shoulder pain: Patient endorses minimal right shoulder pain.  He has no issues with range of motion.  He does not want any prescription or referral to physical therapist for his right shoulder pain.  He says he has pain gel at home which he uses as needed.      The following portions of the patient's history were reviewed and updated as appropriate: allergies, current medications, past family history, past medical history, past social history, past surgical history and problem list.    Past Medical Hx:  Past Medical History:   Diagnosis Date   • Aching leg syndrome    • Arthritis    • Carpal tunnel syndrome    • Chronic pharyngitis    • Derangement of posterior horn of medial meniscus due to old tear or injury, unspecified knee     Old tear of posterior horn of medial meniscus    • Derangement of unspecified medial meniscus due to old tear or injury, left knee    • Diabetes mellitus (CMS/HCC)    • Gastroesophageal reflux disease    • Obesity    • LAVELL on CPAP    • Osteoarthritis of knee    • Plantar fasciitis    • Radicular pain of lumbosacral region     Pain radiating to lumbar region of back     • Urticaria        Past Surgical Hx:  Past Surgical History:   Procedure Laterality Date   • COLONOSCOPY N/A 3/3/2017    Procedure: COLONOSCOPY;  Surgeon: Delmar Ward MD;  Location: [facility] ENDOSCOPY;  Service:    • FOOT SURGERY Right 2016    infection top of foot   • HAND SURGERY         Current Meds:    Current Outpatient Medications:   •  lisinopril (PRINIVIL,ZESTRIL) 10 MG tablet, Take 1 tablet by mouth Daily., Disp: 90 tablet, Rfl: 3    Allergies:  Allergies " "  Allergen Reactions   • Penicillins        Family Hx:  Family History   Problem Relation Age of Onset   • Heart disease Mother    • Asthma Mother    • Diabetes Mother    • Arthritis Mother    • Heart disease Father    • Hypertension Father    • Heart disease Sister    • Hypertension Sister    • Diabetes Sister    • Heart disease Brother    • Hypertension Brother    • Stroke Brother    • Diabetes Brother    • Clotting disorder Brother         Social History:  Social History     Socioeconomic History   • Marital status:      Spouse name: Not on file   • Number of children: Not on file   • Years of education: Not on file   • Highest education level: Not on file   Tobacco Use   • Smoking status: Never Smoker   • Smokeless tobacco: Never Used   Substance and Sexual Activity   • Alcohol use: No   • Drug use: No   • Sexual activity: Defer       Review of Systems  Review of Systems   Constitutional: Negative for chills and fever.   HENT: Negative for facial swelling, nosebleeds and trouble swallowing.    Eyes: Negative for photophobia and visual disturbance.   Respiratory: Negative for choking and stridor.    Gastrointestinal: Negative for abdominal distention, diarrhea, nausea and vomiting.   Genitourinary: Negative for difficulty urinating and dysuria.   Musculoskeletal: Negative for arthralgias, gait problem and myalgias.   Skin: Negative for pallor and rash.   Neurological: Negative for seizures and syncope.   Psychiatric/Behavioral: Negative for dysphoric mood and hallucinations.       Objective:     /82   Pulse 66   Ht 170.2 cm (67\")   Wt 104 kg (230 lb)   SpO2 97%   BMI 36.02 kg/m²   Physical Exam  Constitutional:       General: He is not in acute distress.     Appearance: He is well-developed.   HENT:      Head: Normocephalic and atraumatic.      Right Ear: External ear normal.      Left Ear: External ear normal.      Nose: Nose normal.   Eyes:      Conjunctiva/sclera: Conjunctivae normal.      " Pupils: Pupils are equal, round, and reactive to light.   Neck:      Musculoskeletal: Normal range of motion and neck supple.   Cardiovascular:      Rate and Rhythm: Normal rate and regular rhythm.      Heart sounds: Normal heart sounds. No murmur. No friction rub. No gallop.    Pulmonary:      Effort: Pulmonary effort is normal. No respiratory distress.      Breath sounds: Normal breath sounds. No wheezing.   Abdominal:      General: Bowel sounds are normal. There is no distension.      Palpations: Abdomen is soft.      Tenderness: There is no abdominal tenderness. There is no guarding.   Musculoskeletal: Normal range of motion.   Skin:     General: Skin is warm and dry.   Neurological:      Mental Status: He is alert and oriented to person, place, and time.      Cranial Nerves: No cranial nerve deficit.      Coordination: Coordination normal.   Psychiatric:         Behavior: Behavior normal.         Thought Content: Thought content normal.         Judgment: Judgment normal.          Assessment/Plan:   Diastases of rectus sheath: Based on my physical examination, patient does not have a hernia.  This is a normal diastases of the rectus sheath.  I counseled the patient that this was normal after which he was relieved.      Right shoulder pain: Intermittent right shoulder pain.  Patient denied any pain at today's visit.  No issues with range of motion.  Advised patient to use the pain gel that he has at home given that he does not want any prescription medication or referral to physical therapy.    Follow-up in 1 week for annual physical.     Follow-up:     Return in about 1 week (around 12/1/2020) for Annual.    Preventative:  Health Maintenance   Topic Date Due   • ANNUAL PHYSICAL  07[birth year]03/1968   • Hepatitis B (1 of 3 - Risk 3-dose series) 07/03/1984   • DIABETIC EYE EXAM  01/09/2019   • DIABETIC FOOT EXAM  01/21/2020   • URINE MICROALBUMIN  06/22/2020   • INFLUENZA VACCINE  08/01/2020   • HEMOGLOBIN A1C  08/07/2020    • TDAP/TD VACCINES (2 - Td) 09/09/2023   • COLONOSCOPY  03/03/2027   • HEPATITIS C SCREENING  Completed   • Pneumococcal Vaccine 0-64  Completed   • ZOSTER VACCINE  Discontinued       Alcohol use:  reports no history of alcohol use.  Nicotine status  reports that he has never smoked. He has never used smokeless tobacco.    Goals     • Weight (lb) < 90.7 kg (200 lb) (pt-stated)           RISK SCORE: 3              This document has been electronically signed by Stalin Baker MD on November 27, 2020 09:14 CST

## 2020-11-30 NOTE — PROGRESS NOTES
I have reviewed the patient.  I have reviewed the notes, assessments, and/or procedures performed by Dr Stalin Baker, I concur with his  documentation and assessment and plan for Ceferino Pereira.          This document has been electronically signed by Jonas Coon MD on November 30, 2020 09:21 CST

## 2020-12-21 ENCOUNTER — OFFICE VISIT (OUTPATIENT)
Dept: SLEEP MEDICINE | Facility: HOSPITAL | Age: 55
End: 2020-12-21

## 2020-12-21 VITALS
SYSTOLIC BLOOD PRESSURE: 140 MMHG | BODY MASS INDEX: 37.02 KG/M2 | DIASTOLIC BLOOD PRESSURE: 82 MMHG | HEIGHT: 67 IN | OXYGEN SATURATION: 98 % | HEART RATE: 77 BPM | WEIGHT: 235.9 LBS

## 2020-12-21 DIAGNOSIS — G47.33 OBSTRUCTIVE SLEEP APNEA, ADULT: Primary | ICD-10-CM

## 2020-12-21 PROCEDURE — 99213 OFFICE O/P EST LOW 20 MIN: CPT | Performed by: NURSE PRACTITIONER

## 2020-12-21 NOTE — PROGRESS NOTES
Sleep Clinic Follow Up    Date: 2020  Primary Care Provider: Elvia Leblanc MD    Last office visit: 2019 (I reviewed this note)    CC: Follow up: LAVELL on CPAP      Interim History:  Since the last visit:    1) severe LAVELL -  Ceferino Pereira has remained compliant with CPAP. He denies mask and machine issues, dry mouth, headaches, or pressures intolerance. He denies abnormal dreams, sleep paralysis, nasal congestion, URI sx.    Sleep Testin. PSG on 10/24/2015, AHI of 95   2. CPAP titration, recommended 14 cm H2O   3. Currently on 15 cm H2O    PAP Data:    Time frame: 2020-2020   Compliance: 100 %  Average use on days used: 7hrs 14 min  Percent of days with usage greater than or equal to 4 hours: 98.9%  PAP range: 15 cm H2O  Average 90% pressure: 15 cmH2O  Leak: 1 hour 15 minutes  Average AHI: 3.5 events/hr  Mask type: Full face mask  DME: [facility]    Bed time: 2100  Sleep latency: 5 minutes  Number of times awakens during the night: 2-3  Wake time: 0500  Estimated total sleep time at night: 8-9 hours  Caffeine intake: 1-3 cups of coffee, 0 cups of tea, and 1-2 sodas per day  Alcohol intake: 0 drinks per week  Nap time: denies   Sleepiness with Driving: denies       2) Patient denies RLS symptoms.     PMHx, FH, SH reviewed and pertinent changes are:  unchanged from last office visit on 2019      REVIEW OF SYSTEMS:   Negative for chest pain, SOA, fever, chills, cough, N/V/D, abdominal pain.    Smoking:none        Exam:  Vitals:    20 1500   BP: 140/82   Pulse: 77   SpO2: 98%           20  1500   Weight: 107 kg (235 lb 14.4 oz)     Body mass index is 36.94 kg/m². Patient's Body mass index is 36.94 kg/m². BMI is above normal parameters. Recommendations include: referral to primary care.      Gen:                No distress, conversant, pleasant, appears stated age, alert, oriented  Eyes:               Anicteric sclera, moist conjunctiva, no lid lag                            PERRL, EOMI   Heent:             NC/AT                          Oropharynx clear                          Normal hearing  Lungs:             normal effort, non-labored breathing                          Clear to auscultation bilaterally          CV:                  Normal S1/S2, no murmur                          no lower extremity edema                 Psych:             Appropriate affect  Neuro:             CN 2-12 appear intact    Past Medical History:   Diagnosis Date   • Aching leg syndrome    • Arthritis    • Carpal tunnel syndrome    • Chronic pharyngitis    • Derangement of posterior horn of medial meniscus due to old tear or injury, unspecified knee     Old tear of posterior horn of medial meniscus    • Derangement of unspecified medial meniscus due to old tear or injury, left knee    • Diabetes mellitus (CMS/HCC)    • Gastroesophageal reflux disease    • Obesity    • LAVELL on CPAP    • Osteoarthritis of knee    • Plantar fasciitis    • Radicular pain of lumbosacral region     Pain radiating to lumbar region of back     • Urticaria        Current Outpatient Medications:   •  lisinopril (PRINIVIL,ZESTRIL) 10 MG tablet, Take 1 tablet by mouth Daily., Disp: 90 tablet, Rfl: 3      Assessment and Plan:    1. Obstructive sleep apnea  -Established, stable (1)  1. Compliant with PAP therapy  2. Leak addressed, be sure to change out mask and supplies as directed   3. Continue PAP as prescribed  4. Script for PAP supplies  5. Return to clinic in 12 months with compliance report unless change in symptoms in interim period      8 of 15 minutes spent face-to-face counseling patient extensively regarding:   PAP therapy, PAP compliance and PAP maintenance      RTC in 12 months. Patient agrees to return sooner if changes in symptoms.          This document has been electronically signed by PALOMA Rossi on December 21, 2020 15:07 CST            CC: Elvia Leblanc MD          No ref. provider  found

## 2021-02-12 ENCOUNTER — TRANSCRIBE ORDERS (OUTPATIENT)
Dept: GENERAL RADIOLOGY | Facility: CLINIC | Age: 56
End: 2021-02-12

## 2021-02-12 DIAGNOSIS — M54.2 CERVICALGIA: Primary | ICD-10-CM

## 2021-07-22 PROCEDURE — 87635 SARS-COV-2 COVID-19 AMP PRB: CPT | Performed by: EMERGENCY MEDICINE

## 2021-08-13 ENCOUNTER — TELEPHONE (OUTPATIENT)
Dept: FAMILY MEDICINE CLINIC | Facility: CLINIC | Age: 56
End: 2021-08-13

## 2021-08-13 ENCOUNTER — OFFICE VISIT (OUTPATIENT)
Dept: SLEEP MEDICINE | Facility: HOSPITAL | Age: 56
End: 2021-08-13

## 2021-08-13 VITALS
DIASTOLIC BLOOD PRESSURE: 76 MMHG | OXYGEN SATURATION: 97 % | HEIGHT: 68 IN | WEIGHT: 227.5 LBS | SYSTOLIC BLOOD PRESSURE: 117 MMHG | BODY MASS INDEX: 34.48 KG/M2 | HEART RATE: 60 BPM

## 2021-08-13 DIAGNOSIS — G47.33 OBSTRUCTIVE SLEEP APNEA, ADULT: Primary | ICD-10-CM

## 2021-08-13 PROCEDURE — 99212 OFFICE O/P EST SF 10 MIN: CPT | Performed by: NURSE PRACTITIONER

## 2021-08-13 NOTE — TELEPHONE ENCOUNTER
TRIED TO CALL PATIENT TO RESCHEDULE APPT ON 08/17; UNABLE TO REACH PT X1.            THANK YOU,      JACOB

## 2021-08-13 NOTE — PROGRESS NOTES
Sleep Clinic Follow Up    Date: 2021  Primary Care Provider: Elvia Leblanc MD    Last office visit: 2020 (I reviewed this note)    CC: Follow up: LAVELL on CPAP, needs new machine       Interim History:  Since the last visit:    1) severe LAVELL -  Ceferino Pereira has remained compliant with CPAP. He denies mask issues, dry mouth, headaches, or pressures intolerance. He denies abnormal dreams, sleep paralysis, nasal congestion, URI sx.  His machine is part of the [employer] recall. He has registered it online. Needs order for new machine.     2) Patient denies RLS symptoms.       Sleep Testin. PSG on 10/24/2015, AHI of 95   2. CPAP titration, recommended 14 cm H2O   3. Currently on 15 cm H2O    PAP Data:  No data available - currently not using due to recall   Mask type: Full face mask  DME: wants to use [facility]     Bed time: 6031-8756  Sleep latency: 20-30 minutes  Number of times awakens during the night: 6-7  Wake time: 0545  Estimated total sleep time at night: 3-4 hours  Caffeine intake: 0-1 cups of coffee, 0-1 cups of tea, and 0-1 sodas per day  Alcohol intake: 0 drinks per week  Nap time: denies    Sleepiness with Driving: denies      [place] - 10        PMHx, FH, SH reviewed and pertinent changes are: started on metformin; otherwise  unchanged from last office visit on 2020      REVIEW OF SYSTEMS:   Negative for chest pain, SOA, fever, chills, cough, N/V/D, abdominal pain.    Smoking:none             Exam:  Vitals:    21 0825   BP: 117/76   Pulse: 60   SpO2: 97%           21  0825   Weight: 103 kg (227 lb 8 oz)     Body mass index is 34.6 kg/m². Patient's Body mass index is 34.6 kg/m². indicating that he is obese (BMI >30). Obesity-related health conditions include the following: obstructive sleep apnea. Obesity is unchanged. BMI is is above average; BMI management plan is completed. We discussed portion control and increasing exercise..      Gen:                No  distress, conversant, pleasant, appears stated age, alert, oriented  Eyes:               Anicteric sclera, moist conjunctiva, no lid lag                           EOMI   Lungs:             normal effort, non-labored breathing                          Clear to auscultation bilaterally          CV:                  Normal S1/S2, no murmur                          no lower extremity edema               Psych:             Appropriate affect  Neuro:             CN 2-12 appear intact    Past Medical History:   Diagnosis Date   • Aching leg syndrome    • Arthritis    • Carpal tunnel syndrome    • Chronic pharyngitis    • Derangement of posterior horn of medial meniscus due to old tear or injury, unspecified knee     Old tear of posterior horn of medial meniscus    • Derangement of unspecified medial meniscus due to old tear or injury, left knee    • Diabetes mellitus (CMS/HCC)    • Gastroesophageal reflux disease    • Obesity    • LAVELL on CPAP    • Osteoarthritis of knee    • Plantar fasciitis    • Radicular pain of lumbosacral region     Pain radiating to lumbar region of back     • Urticaria        Current Outpatient Medications:   •  lisinopril (PRINIVIL,ZESTRIL) 10 MG tablet, Take 1 tablet by mouth Daily., Disp: 90 tablet, Rfl: 0  •  metFORMIN (GLUCOPHAGE) 500 MG tablet, Take 1 tablet by mouth 2 (Two) Times a Day With Meals for 30 days., Disp: 60 tablet, Rfl: 0      Assessment and Plan:    1. Obstructive sleep apnea  -Established, stable (1)  1. Compliant with PAP therapy- order for new CPAP machine 15 cm H2O with PAP supplies and mask  2. Continue PAP as prescribed  3. Script for PAP supplies  4. Drowsy driving tips- do not drive if feeling sleepy   5. Return to clinic in 3 months with compliance report unless change in symptoms in interim period          I spent 11 minutes caring for Ceferino on this date of service. This time includes time spent by me in the following activities: preparing for the visit, obtaining and/or  reviewing a separately obtained history, performing a medically appropriate examination and/or evaluation, counseling and educating the patient/family/caregiver, ordering medications, tests, or procedures and documenting information in the medical record.           This document has been electronically signed by PALOMA Rossi on August 13, 2021 08:28 CDT            CC: Elvia Leblanc MD          No ref. provider found

## 2021-08-17 DIAGNOSIS — I10 ESSENTIAL HYPERTENSION: ICD-10-CM

## 2021-08-17 RX ORDER — LISINOPRIL 10 MG/1
TABLET ORAL
Qty: 90 TABLET | Refills: 3 | OUTPATIENT
Start: 2021-08-17

## 2021-08-17 NOTE — TELEPHONE ENCOUNTER
CALLED PATIENT TO SCHEDULE AN APT WITH OUR OFFICE FOR MEDICATION REFILLS. UNABLE TO REACH PATIENT, LEFT VM.    THANKS,  SAMINA

## 2021-08-20 ENCOUNTER — LAB (OUTPATIENT)
Dept: LAB | Facility: HOSPITAL | Age: 56
End: 2021-08-20

## 2021-08-20 ENCOUNTER — OFFICE VISIT (OUTPATIENT)
Dept: FAMILY MEDICINE CLINIC | Facility: CLINIC | Age: 56
End: 2021-08-20

## 2021-08-20 VITALS
SYSTOLIC BLOOD PRESSURE: 142 MMHG | OXYGEN SATURATION: 95 % | WEIGHT: 225.6 LBS | TEMPERATURE: 97.5 F | DIASTOLIC BLOOD PRESSURE: 76 MMHG | HEIGHT: 67 IN | HEART RATE: 80 BPM | BODY MASS INDEX: 35.41 KG/M2

## 2021-08-20 DIAGNOSIS — Z00.00 HEALTHCARE MAINTENANCE: Primary | ICD-10-CM

## 2021-08-20 DIAGNOSIS — Z00.00 HEALTHCARE MAINTENANCE: ICD-10-CM

## 2021-08-20 DIAGNOSIS — M25.561 LATERAL KNEE PAIN, RIGHT: ICD-10-CM

## 2021-08-20 DIAGNOSIS — M79.601 MUSCULOSKELETAL ARM PAIN, RIGHT: ICD-10-CM

## 2021-08-20 LAB
ALBUMIN SERPL-MCNC: 4.4 G/DL (ref 3.5–5.2)
ALBUMIN/GLOB SERPL: 1.6 G/DL
ALP SERPL-CCNC: 63 U/L (ref 39–117)
ALT SERPL W P-5'-P-CCNC: 31 U/L (ref 1–41)
ANION GAP SERPL CALCULATED.3IONS-SCNC: 12 MMOL/L (ref 5–15)
AST SERPL-CCNC: 27 U/L (ref 1–40)
BASOPHILS # BLD AUTO: 0.05 10*3/MM3 (ref 0–0.2)
BASOPHILS NFR BLD AUTO: 0.5 % (ref 0–1.5)
BILIRUB SERPL-MCNC: 0.5 MG/DL (ref 0–1.2)
BUN SERPL-MCNC: 15 MG/DL (ref 6–20)
BUN/CREAT SERPL: 16.1 (ref 7–25)
CALCIUM SPEC-SCNC: 9.8 MG/DL (ref 8.6–10.5)
CHLORIDE SERPL-SCNC: 101 MMOL/L (ref 98–107)
CO2 SERPL-SCNC: 26 MMOL/L (ref 22–29)
CREAT SERPL-MCNC: 0.93 MG/DL (ref 0.76–1.27)
DEPRECATED RDW RBC AUTO: 40 FL (ref 37–54)
EOSINOPHIL # BLD AUTO: 0.18 10*3/MM3 (ref 0–0.4)
EOSINOPHIL NFR BLD AUTO: 1.9 % (ref 0.3–6.2)
ERYTHROCYTE [DISTWIDTH] IN BLOOD BY AUTOMATED COUNT: 13.1 % (ref 12.3–15.4)
GFR SERPL CREATININE-BSD FRML MDRD: 84 ML/MIN/1.73
GLOBULIN UR ELPH-MCNC: 2.7 GM/DL
GLUCOSE SERPL-MCNC: 115 MG/DL (ref 65–99)
HBA1C MFR BLD: 6.9 % (ref 4.8–5.6)
HCT VFR BLD AUTO: 45.2 % (ref 37.5–51)
HGB BLD-MCNC: 15.2 G/DL (ref 13–17.7)
IMM GRANULOCYTES # BLD AUTO: 0.03 10*3/MM3 (ref 0–0.05)
IMM GRANULOCYTES NFR BLD AUTO: 0.3 % (ref 0–0.5)
LYMPHOCYTES # BLD AUTO: 3.85 10*3/MM3 (ref 0.7–3.1)
LYMPHOCYTES NFR BLD AUTO: 41 % (ref 19.6–45.3)
MCH RBC QN AUTO: 28.5 PG (ref 26.6–33)
MCHC RBC AUTO-ENTMCNC: 33.6 G/DL (ref 31.5–35.7)
MCV RBC AUTO: 84.8 FL (ref 79–97)
MONOCYTES # BLD AUTO: 0.7 10*3/MM3 (ref 0.1–0.9)
MONOCYTES NFR BLD AUTO: 7.5 % (ref 5–12)
NEUTROPHILS NFR BLD AUTO: 4.57 10*3/MM3 (ref 1.7–7)
NEUTROPHILS NFR BLD AUTO: 48.8 % (ref 42.7–76)
NRBC BLD AUTO-RTO: 0 /100 WBC (ref 0–0.2)
PLATELET # BLD AUTO: 284 10*3/MM3 (ref 140–450)
PMV BLD AUTO: 10.7 FL (ref 6–12)
POTASSIUM SERPL-SCNC: 4.1 MMOL/L (ref 3.5–5.2)
PROT SERPL-MCNC: 7.1 G/DL (ref 6–8.5)
RBC # BLD AUTO: 5.33 10*6/MM3 (ref 4.14–5.8)
SODIUM SERPL-SCNC: 139 MMOL/L (ref 136–145)
WBC # BLD AUTO: 9.38 10*3/MM3 (ref 3.4–10.8)

## 2021-08-20 PROCEDURE — 36415 COLL VENOUS BLD VENIPUNCTURE: CPT

## 2021-08-20 PROCEDURE — 83036 HEMOGLOBIN GLYCOSYLATED A1C: CPT

## 2021-08-20 PROCEDURE — 99213 OFFICE O/P EST LOW 20 MIN: CPT | Performed by: STUDENT IN AN ORGANIZED HEALTH CARE EDUCATION/TRAINING PROGRAM

## 2021-08-20 PROCEDURE — 80053 COMPREHEN METABOLIC PANEL: CPT

## 2021-08-20 PROCEDURE — 85025 COMPLETE CBC W/AUTO DIFF WBC: CPT

## 2021-08-20 NOTE — PROGRESS NOTES
Family Medicine Residency  Stalin Baker MD    Subjective:     Ceferino Pereira is a 56 y.o. male who presents for health care maintenance visit and for evaluation of right shoulder and right popliteal knee pain. Has been completing physical therapy on his own without much improvement. Has been applying analgesic gel to affected area at home which has not helped alleviate pain. Would like blood work completing and evaluation of right shoulder and right popliteal pain.    The following portions of the patient's history were reviewed and updated as appropriate: allergies, current medications, past family history, past medical history, past social history, past surgical history and problem list.    Past Medical Hx:  Past Medical History:   Diagnosis Date   • Aching leg syndrome    • Arthritis    • Carpal tunnel syndrome    • Chronic pharyngitis    • Derangement of posterior horn of medial meniscus due to old tear or injury, unspecified knee     Old tear of posterior horn of medial meniscus    • Derangement of unspecified medial meniscus due to old tear or injury, left knee    • Diabetes mellitus (CMS/HCC)    • Gastroesophageal reflux disease    • Obesity    • LAVELL on CPAP    • Osteoarthritis of knee    • Plantar fasciitis    • Radicular pain of lumbosacral region     Pain radiating to lumbar region of back     • Urticaria        Past Surgical Hx:  Past Surgical History:   Procedure Laterality Date   • COLONOSCOPY N/A 3/3/2017    Procedure: COLONOSCOPY;  Surgeon: Delmar Ward MD;  Location: [facility] ENDOSCOPY;  Service:    • FOOT SURGERY Right 2016    infection top of foot   • HAND SURGERY         Current Meds:    Current Outpatient Medications:   •  lisinopril (PRINIVIL,ZESTRIL) 10 MG tablet, Take 1 tablet by mouth Daily., Disp: 90 tablet, Rfl: 0  •  metFORMIN (GLUCOPHAGE) 500 MG tablet, Take 1 tablet by mouth 2 (Two) Times a Day With Meals for 30 days., Disp: 60 tablet, Rfl: 0    Allergies:  Allergies  "  Allergen Reactions   • Penicillins Unknown - High Severity       Family Hx:  Family History   Problem Relation Age of Onset   • Heart disease Mother    • Asthma Mother    • Diabetes Mother    • Arthritis Mother    • Heart disease Father    • Hypertension Father    • Heart disease Sister    • Hypertension Sister    • Diabetes Sister    • Heart disease Brother    • Hypertension Brother    • Stroke Brother    • Diabetes Brother    • Clotting disorder Brother         Social History:  Social History     Socioeconomic History   • Marital status:      Spouse name: Not on file   • Number of children: Not on file   • Years of education: Not on file   • Highest education level: Not on file   Tobacco Use   • Smoking status: Never Smoker   • Smokeless tobacco: Never Used   Substance and Sexual Activity   • Alcohol use: No   • Drug use: No   • Sexual activity: Defer       Review of Systems  Review of Systems   Constitutional: Negative for chills and fever.   HENT: Negative for facial swelling, nosebleeds and trouble swallowing.    Eyes: Negative for photophobia and visual disturbance.   Respiratory: Negative for choking and stridor.    Gastrointestinal: Negative for abdominal distention, diarrhea, nausea and vomiting.   Genitourinary: Negative for difficulty urinating and dysuria.   Musculoskeletal: Negative for arthralgias, gait problem and myalgias.        Right shoulder pain. Right lateral knee pain   Skin: Negative for pallor and rash.   Neurological: Negative for seizures and syncope.   Psychiatric/Behavioral: Negative for dysphoric mood and hallucinations.       Objective:     /76   Pulse 80   Temp 97.5 °F (36.4 °C)   Ht 170.2 cm (67\")   Wt 102 kg (225 lb 9.6 oz)   SpO2 95%   BMI 35.33 kg/m²   Physical Exam  Constitutional:       General: He is not in acute distress.     Appearance: He is well-developed.   HENT:      Head: Normocephalic and atraumatic.      Nose: Nose normal.   Eyes:      " Conjunctiva/sclera: Conjunctivae normal.      Pupils: Pupils are equal, round, and reactive to light.   Cardiovascular:      Rate and Rhythm: Normal rate and regular rhythm.      Heart sounds: Normal heart sounds. No murmur heard.   No friction rub. No gallop.    Pulmonary:      Effort: Pulmonary effort is normal. No respiratory distress.      Breath sounds: Normal breath sounds. No wheezing.   Abdominal:      General: Abdomen is flat. Bowel sounds are normal. There is no distension.      Palpations: Abdomen is soft.      Tenderness: There is no abdominal tenderness. There is no guarding.   Musculoskeletal:         General: Normal range of motion.      Right shoulder: Tenderness present. Normal range of motion.      Left shoulder: Normal range of motion.      Cervical back: Normal range of motion and neck supple.        Legs:    Skin:     General: Skin is warm and dry.   Neurological:      Mental Status: He is alert and oriented to person, place, and time.      Cranial Nerves: No cranial nerve deficit.      Coordination: Coordination normal.   Psychiatric:         Behavior: Behavior normal.          Assessment/Plan:     Diagnoses and all orders for this visit:    1. Healthcare maintenance (Primary)  -     Hemoglobin A1c; Future  -     CBC w AUTO Differential; Future  -     Comprehensive metabolic panel; Future    2. Musculoskeletal arm pain, right  -     Cancel: XR Knee 1 or 2 View Right (In Office)  -     XR Shoulder 2+ View Right; Future  -     Ambulatory Referral to Physical Therapy Evaluate and treat; Soft Tissue Mobilizaton; Stretching, ROM  -     XR knee 4+ vw right  -     Ambulatory Referral to Orthopedic Surgery    3. Lateral knee pain, right  -     Ambulatory Referral to Orthopedic Surgery    -We'll obtain basic labs  -We'll obtain imaging of the knee and right shoulder  -Will refer to Orthopedic team for further evaluation. Appreciate help     Follow-up:     Return in about 4 weeks (around 9/17/2021) for  Recheck.    Preventative:  Health Maintenance   Topic Date Due   • ANNUAL PHYSICAL  Never done   • COVID-19 Vaccine (1) Never done   • Hepatitis B (1 of 3 - Risk 3-dose series) Never done   • DIABETIC EYE EXAM  01/09/2019   • DIABETIC FOOT EXAM  01/21/2020   • URINE MICROALBUMIN  06/22/2020   • HEMOGLOBIN A1C  08/07/2020   • INFLUENZA VACCINE  10/01/2021   • TDAP/TD VACCINES (2 - Td or Tdap) 09/09/2023   • COLORECTAL CANCER SCREENING  03/03/2027   • Pneumococcal Vaccine 0-64 (2 of 2 - PPSV23) 07/03/2030   • HEPATITIS C SCREENING  Completed   • ZOSTER VACCINE  Discontinued       Alcohol use:  reports no history of alcohol use.  Nicotine status  reports that he has never smoked. He has never used smokeless tobacco.    Goals     •  Weight (lb) < 90.7 kg (200 lb) (pt-stated)           RISK SCORE: 3          PGY-3  [facility] Family Medicine Residency  This document has been electronically signed by Stalin Baker MD on August 20, 2021 10:11 CDT

## 2021-08-23 NOTE — PROGRESS NOTES
I have reviewed the patient.  I have reviewed the notes, assessments, and/or procedures performed by Dr Stalin Baker, I concur with his  documentation and assessment and plan for Ceferino Pereira.          This document has been electronically signed by Jonas Coon MD on August 23, 2021 08:56 CDT

## 2021-09-03 ENCOUNTER — OFFICE VISIT (OUTPATIENT)
Dept: ORTHOPEDIC SURGERY | Facility: CLINIC | Age: 56
End: 2021-09-03

## 2021-09-03 ENCOUNTER — HOSPITAL ENCOUNTER (OUTPATIENT)
Dept: PHYSICAL THERAPY | Facility: HOSPITAL | Age: 56
Setting detail: THERAPIES SERIES
Discharge: HOME | End: 2021-09-03

## 2021-09-03 VITALS — WEIGHT: 225 LBS | HEIGHT: 67 IN | BODY MASS INDEX: 35.31 KG/M2

## 2021-09-03 DIAGNOSIS — M25.511 ACUTE PAIN OF RIGHT SHOULDER: Primary | ICD-10-CM

## 2021-09-03 DIAGNOSIS — M79.601 MUSCULOSKELETAL ARM PAIN, RIGHT: Primary | ICD-10-CM

## 2021-09-03 DIAGNOSIS — M75.51 SUBACROMIAL BURSITIS OF RIGHT SHOULDER JOINT: ICD-10-CM

## 2021-09-03 PROCEDURE — 97162 PT EVAL MOD COMPLEX 30 MIN: CPT

## 2021-09-03 PROCEDURE — 99203 OFFICE O/P NEW LOW 30 MIN: CPT | Performed by: ORTHOPAEDIC SURGERY

## 2021-09-03 PROCEDURE — 20610 DRAIN/INJ JOINT/BURSA W/O US: CPT | Performed by: ORTHOPAEDIC SURGERY

## 2021-09-03 RX ORDER — TRIAMCINOLONE ACETONIDE 40 MG/ML
80 INJECTION, SUSPENSION INTRA-ARTICULAR; INTRAMUSCULAR
Status: COMPLETED | OUTPATIENT
Start: 2021-09-03 | End: 2021-09-03

## 2021-09-03 RX ORDER — BUPIVACAINE HYDROCHLORIDE 5 MG/ML
3 INJECTION, SOLUTION PERINEURAL
Status: COMPLETED | OUTPATIENT
Start: 2021-09-03 | End: 2021-09-03

## 2021-09-03 RX ADMIN — BUPIVACAINE HYDROCHLORIDE 3 ML: 5 INJECTION, SOLUTION PERINEURAL at 09:47

## 2021-09-03 RX ADMIN — TRIAMCINOLONE ACETONIDE 80 MG: 40 INJECTION, SUSPENSION INTRA-ARTICULAR; INTRAMUSCULAR at 09:47

## 2021-09-03 NOTE — THERAPY EVALUATION
Outpatient Physical Therapy Ortho Initial Evaluation  [facility]     Patient Name: Ceferino Pereira  : 1965  MRN: [id number]  Today's Date: 9/3/2021      Visit Date: 2021   Attendance:  (TBD approved)  Subjective Improvement: n/a  Next MD Visit: 10/1/2021  Recert Date: 2021    Therapy Diagnosis: Right shoulder pain      Patient Active Problem List   Diagnosis   • Type 2 diabetes mellitus without complication, without long-term current use of insulin (CMS/HCC)   • LAVELL on CPAP   • Class 2 obesity due to excess calories without serious comorbidity with body mass index (BMI) of 35.0 to 35.9 in adult   • Hyperuricemia   • Prediabetes        Past Medical History:   Diagnosis Date   • Aching leg syndrome    • Arthritis    • Carpal tunnel syndrome    • Chronic pharyngitis    • Derangement of posterior horn of medial meniscus due to old tear or injury, unspecified knee     Old tear of posterior horn of medial meniscus    • Derangement of unspecified medial meniscus due to old tear or injury, left knee    • Diabetes mellitus (CMS/HCC)    • Gastroesophageal reflux disease    • Obesity    • LAVELL on CPAP    • Osteoarthritis of knee    • Plantar fasciitis    • Radicular pain of lumbosacral region     Pain radiating to lumbar region of back     • Urticaria         Past Surgical History:   Procedure Laterality Date   • COLONOSCOPY N/A 3/3/2017    Procedure: COLONOSCOPY;  Surgeon: Delmar Ward MD;  Location: [facility] ENDOSCOPY;  Service:    • FOOT SURGERY Right 2016    infection top of foot   • HAND SURGERY       Current Outpatient Medications   Medication Instructions   • lisinopril (PRINIVIL,ZESTRIL) 10 mg, Oral, Daily   • metFORMIN (GLUCOPHAGE) 500 mg, Oral, 2 Times Daily With Meals       Allergies   Allergen Reactions   • Penicillins Unknown - High Severity         Visit Dx:     ICD-10-CM ICD-9-CM   1. Musculoskeletal arm pain, right  M79.601 729.5         Patient History     Row Name 21  "0800             History    Chief Complaint  Pain  -      Type of Pain  Shoulder pain right  -      Date Current Problem(s) Began  -- \"Fall 2020\"  -      Brief Description of Current Complaint  Pt stated that about a year ago, he was running through his yard downhill after it rained with his kids when he fell and landed on his right side. He went to his PCP with concerns of shoulder pain. PCP recommended therapy but due to COVID, work, and personal events, he was unable to start therapy until now.  male living with his wife.   -      Patient/Caregiver Goals  Relieve pain;Improve mobility  -      Current Tobacco Use  No  -      Smoking Status  Never  -      Patient's Rating of General Health  Good  -      Hand Dominance  right-handed  -      Occupation/sports/leisure activities  Occupation:  at [employer]. Hobbies: fishing, playing with grandkids.  -      What clinical tests have you had for this problem?  X-ray  -      Results of Clinical Tests  xray: \"Normal right shoulder\"  -         Pain     Pain Location  Shoulder Right  -      Pain at Present  2  -      Pain at Best  2  -      Pain at Worst  5  -      Pain Frequency  Constant/continuous  -      Pain Description  Aching;Sharp;Tingling  -      What Performance Factors Make the Current Problem(s) WORSE?  Rolling over in bed, \"over doing it\", lifting, pushing, pulling, reaching, overhead activities, reaching behind back.   -      What Performance Factors Make the Current Problem(s) BETTER?  Creams, Icy Hot, rest  -      Is your sleep disturbed?  Yes  -      Is medication used to assist with sleep?  Yes  -      Difficulties at work?  Lifting. pushing, pulling, reaching   -      Difficulties with ADL's?  Dressing, bathing,   -      Difficulties with recreational activities?  no  -         Fall Risk Assessment    Any falls in the past year:  Yes  -      Number of falls reported in the last 12 months  2 "  -      Factors that contributed to the fall:  Slippery surface  -         Daily Activities    Primary Language  English  -        User Key  (r) = Recorded By, (t) = Taken By, (c) = Cosigned By    Initials Name Provider Type    Lexie Arriaza, PT Physical Therapist          PT Ortho     Row Name 09/03/21 0800       Subjective Comments    Subjective Comments  See therapy patient history.  -       Subjective Pain    Able to rate subjective pain?  yes  -    Pre-Treatment Pain Level  2  -    Post-Treatment Pain Level  2  -       Posture/Observations    Posture/Observations Comments  Equal shoulder height. Mild forward head and rounded shoulders. TTP: upper trap, supraspinatus, medial border of scapula, AC joint, ant shoulder. Increased muscle tension in upper trap  -       Shoulder Impingement/Rotator Cuff Special Tests    Santos-Butch Test (RC Lesion vs. Bursitis)  Right:;Positive  -    Full Can Test (RC Lesion)  Right:;Positive  -    Empty Can Test (RC Lesion)  Right:;Positive  -    Lift-Off Test (Subscapularis Lesion)  Right:;Positive  -    Hornblower Test (Teres Minor Lesion)  Negative  -       General ROM    RT Upper Ext  Comments  -MH    LT Upper Ext  Comment  -MH       Right Upper Ext    Rt Shoulder Abduction AROM  93 deg; pain  -MH    Rt Shoulder Extension AROM  WNL  -    Rt Shoulder Flexion AROM  118 deg; pain  -MH    Rt Shoulder External Rotation AROM  38 deg @ 90/90; pain  -MH    Rt Shoulder Internal Rotation AROM  24 deg @ 90/90; pain  -MH       Left Upper Ext    Lt Upper Extremity Comments   AAROM WNL  -       MMT (Manual Muscle Testing)    Rt Upper Ext  Comments;Rt Shoulder Flexion;Rt Shoulder Extension;Rt Shoulder ABduction;Rt Shoulder Internal Rotation;Rt Shoulder External Rotation;Rt Elbow Flexion;Rt Elbow Extension  -    Lt Upper Ext  Comments  -MH       MMT Right Upper Ext    Rt Shoulder Flexion MMT, Gross Movement  (4/5) good  -    Rt Shoulder Extension MMT,  Gross Movement  (5/5) normal  -MH    Rt Shoulder ABduction MMT, Gross Movement  (4/5) good  -MH    Rt Shoulder Internal Rotation MMT, Gross Movement  (4+/5) good plus  -MH    Rt Shoulder External Rotation MMT, Gross Movement  (4+/5) good plus  -MH    Rt Elbow Flexion MMT, Gross Movement:  (4+/5) good plus  -MH    Rt Elbow Extension MMT, Gross Movement:  (5/5) normal  -MH    Rt Upper Extremity Comments   Pain with all shoulder and elbow MMT  -MH       MMT Left Upper Ext    Lt Upper Extremity Comments   5/5 grossly  -MH       Sensation    Sensation WNL?  WNL  -MH    Light Touch  No apparent deficits  -       Flexibility    Flexibility Tested?  Upper Extremity  -       Upper Extremity Flexibility    Upper Trapezius  Bilateral:;Mildly limited  -    Levator Scapula  Bilateral:;Mildly limited  -      User Key  (r) = Recorded By, (t) = Taken By, (c) = Cosigned By    Initials Name Provider Type    Lexie Arriaza, PT Physical Therapist                      Therapy Education  Education Details: Findings of evaluation and plan for physical therapy.  How Provided: Verbal  Provided to: Patient  Level of Understanding: Verbalized     PT OP Goals     Row Name 09/03/21 0800          PT Short Term Goals    STG Date to Achieve  09/24/21  -     STG 1  Pt will be independent with HEP for self-management of symptoms.  -     STG 1 Progress  New  -     STG 2  Pt's shoulder flex AROM will improve to at least 145 deg  -     STG 2 Progress  New  [facility]     STG 3  Pt's IR will improve to where he can tuck in a shirt and put a belt on without increased pain.  -     STG 3 Progress  New  [facility]        Long Term Goals    LTG Date to Achieve  10/01/21  -     LTG 1  Pt will report a subjective improvement of at least 80% in symptoms as a measure to return to PLOF.  -     LTG 1 Progress  New  -     LTG 2  Pt's QuickDASH score will improve by at least 11 points.  -     LTG 2 Progress  New  [facility]     LTG 3  Pt's shoulder abd AROM  will improve to at least 145 deg.  -     LTG 3 Progress  New  [facility]     LTG 4  Pt's shoulder MMT will improve to 5/5 grossly as a measure of improved strength.  -     LTG 4 Progress  New  [facility]     LTG 5  Pt will demonstrate good body mechanics with pushing, pulling, and lifting a box with 20# in it.  -     LTG 5 Progress  New  [facility]        Time Calculation    PT Goal Re-Cert Due Date  09/24/21  -       User Key  (r) = Recorded By, (t) = Taken By, (c) = Cosigned By    Initials Name Provider Type     Lexie Solitario, PT Physical Therapist          PT Assessment/Plan     Row Name 09/03/21 0800          PT Assessment    Functional Limitations  Limitation in home management;Limitations in community activities;Performance in self-care ADL;Performance in work activities  -     Impairments  Impaired flexibility;Impaired muscle endurance;Impaired muscle length;Impaired muscle power;Muscle strength;Pain;Range of motion;Posture;Poor body mechanics  -     Assessment Comments  Mr. Pereira is a 57yo male who presents to physical therapy with right shoulder/arm pain with no known MARINO. He is currently experiencing pain and difficulty with lifting, reaching, pushing, and pulling. This has affected his ability to complete ADLs and work duties. He demonstrates pain, TTP, decreased ROM, decreased shoulder and scapula strength, postural deviations, and poor body mechanics. Mr. Pereira would benefit from skilled physical therapy to address the above deficits in order for him to return to his prior level of function.   -     Rehab Potential  Good  -     Patient/caregiver participated in establishment of treatment plan and goals  Yes  -     Patient would benefit from skilled therapy intervention  Yes  -        PT Plan    PT Frequency  2x/week  -     Predicted Duration of Therapy Intervention (PT)  3-4 weeks  -     Planned CPT's?  PT EVAL MOD COMPLELITY: 46723;PT RE-EVAL: 19241;PT THER PROC EA 15 MIN: 71747;PT THER ACT EA  15 MIN: 73277;PT MANUAL THERAPY EA 15 MIN: 09529;PT NEUROMUSC RE-EDUCATION EA 15 MIN: 14301;PT SELF CARE/HOME MGMT/TRAIN EA 15: 59262;PT ELECTRICAL STIM UNATTEND: ;PT ELECTRICAL STIM ATTD EA 15 MIN: 36271;PT HOT/COLD PACK WC NONMCARE: 61623;PT THER SUPP EA 15 MIN  -     PT Plan Comments  AA/AROM, shoulder and scap strength, posture, body mechanics with work related duties, stretching, modalities and manual as needed.  -       User Key  (r) = Recorded By, (t) = Taken By, (c) = Cosigned By    Initials Name Provider Type    Lexie Arriaza PT Physical Therapist            OP Exercises     Row Name 09/03/21 0800             Subjective Comments    Subjective Comments  See therapy patient history.  -         Subjective Pain    Able to rate subjective pain?  yes  -      Pre-Treatment Pain Level  2  -      Post-Treatment Pain Level  2  -        User Key  (r) = Recorded By, (t) = Taken By, (c) = Cosigned By    Initials Name Provider Type    Lexie Arriaza PT Physical Therapist                        Outcome Measure Options: Quick DASH  Quick DASH  Open a tight or new jar.: No Difficulty  Do heavy household chores (e.g., wash walls, wash floors): No Difficulty  Carry a shopping bag or briefcase: No Difficulty  Wash your back: Mild Difficulty  Use a knife to cut food: No Difficulty  Recreational activities in which you take some force or impact through your arm, should or hand (e.g. golf, hammering, tennis, etc.): Mild Difficulty  During the past week, to what extent has your arm, shoulder, or hand problem interfered with your normal social activites with family, friends, neighbors or groups?: Slightly  During the past week, were you limited in your work or other regular daily activities as a result of your arm, shoulder or hand problem?: Not limited at all  Arm, Shoulder, or hand pain: Mild  Tingling (pins and needles) in your arm, shoulder, or hand: Mild  During the past week, how much difficulty have  you had sleeping because of the pain in your arm, shoulder or hand?: Moderate Difficiculty  Number of Questions Answered: 11  Quick DASH Score: 15.91         Time Calculation:     Start Time: 0800  Stop Time: 0840  Time Calculation (min): 40 min  Untimed Charges  PT Eval/Re-eval Minutes: 40  Total Minutes  Untimed Charges Total Minutes: 40   Total Minutes: 40     Therapy Charges for Today     Code Description Service Date Service Provider Modifiers Qty    80286916469 HC PT EVAL MOD COMPLEXITY 3 9/3/2021 Lexie Solitario, PT GP 1          PT G-Codes  Outcome Measure Options: Quick DASH  Quick DASH Score: 15.91         Lexie Solitario, PT  9/3/2021

## 2021-09-03 NOTE — PROGRESS NOTES
Ceferino Pereira is a 56 y.o. male   Primary provider:  Elvia Leblanc MD       Chief Complaint   Patient presents with   • Right Shoulder - Initial Evaluation       HISTORY OF PRESENT ILLNESS:  Patient in for initial eval of right shoulder pain  Patient reports, he fell last fall and has been having trouble ever since.    This is the first office visit for evaluation of right shoulder pain.    Mr. Pereira is 56 years old and right-hand dominant.  He fell 9 to 10 months ago landing on his hand with his shoulder extended.  He denies any previous problems with the shoulder.  Initially had mild pain in the shoulder which has slowly worsened.  He complains of pain at the apex of the shoulder and dorsal scapular region with radiation into the arm.  The pain is worse at night and worse with lifting.  There is been no specific relieving factor.  There is been no numbness or tingling in the limb.  He describes his pain is aching in nature.  Treatment has included stretching exercises and topical medication.    Home medications include lisinopril and Metformin.  He is allergic to penicillin.  He does not smoke.  Past medical history is remarkable for reflux sleep apnea and obesity.  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  He is .    Dr. Ramos has asked that I see him for evaluation and treatment.    Shoulder Injury   The right shoulder is affected. The injury mechanism was a fall. The quality of the pain is described as aching and burning. The pain is moderate.        CONCURRENT MEDICAL HISTORY:    Past Medical History:   Diagnosis Date   • Aching leg syndrome    • Arthritis    • Carpal tunnel syndrome    • Chronic pharyngitis    • Derangement of posterior horn of medial meniscus due to old tear or injury, unspecified knee     Old tear of posterior horn of medial meniscus    • Derangement of unspecified medial meniscus due to old tear or injury,  left knee    • Diabetes mellitus (CMS/HCC)    • Gastroesophageal reflux disease    • Obesity    • LAVELL on CPAP    • Osteoarthritis of knee    • Plantar fasciitis    • Radicular pain of lumbosacral region     Pain radiating to lumbar region of back     • Urticaria        Allergies   Allergen Reactions   • Penicillins Unknown - High Severity         Current Outpatient Medications:   •  lisinopril (PRINIVIL,ZESTRIL) 10 MG tablet, Take 1 tablet by mouth Daily., Disp: 90 tablet, Rfl: 0  •  metFORMIN (GLUCOPHAGE) 500 MG tablet, Take 1 tablet by mouth 2 (Two) Times a Day With Meals for 30 days., Disp: 60 tablet, Rfl: 0    Past Surgical History:   Procedure Laterality Date   • COLONOSCOPY N/A 3/3/2017    Procedure: COLONOSCOPY;  Surgeon: Delmar Ward MD;  Location: [facility] ENDOSCOPY;  Service:    • FOOT SURGERY Right 2016    infection top of foot   • HAND SURGERY         Family History   Problem Relation Age of Onset   • Heart disease Mother    • Asthma Mother    • Diabetes Mother    • Arthritis Mother    • Heart disease Father    • Hypertension Father    • Heart disease Sister    • Hypertension Sister    • Diabetes Sister    • Heart disease Brother    • Hypertension Brother    • Stroke Brother    • Diabetes Brother    • Clotting disorder Brother         Social History     Socioeconomic History   • Marital status:      Spouse name: Not on file   • Number of children: Not on file   • Years of education: Not on file   • Highest education level: Not on file   Tobacco Use   • Smoking status: Never Smoker   • Smokeless tobacco: Never Used   Substance and Sexual Activity   • Alcohol use: No   • Drug use: No   • Sexual activity: Defer        Review of Systems   Constitutional: Negative.    HENT: Negative.    Eyes: Negative.    Respiratory: Negative.    Cardiovascular: Negative.    Gastrointestinal: Negative.    Endocrine: Negative.    Genitourinary: Negative.    Musculoskeletal: Negative.    Skin: Negative.   "  Allergic/Immunologic: Negative.    Neurological: Negative.    Hematological: Negative.    Psychiatric/Behavioral: Negative.      Review of systems is positive as noted above.  PHYSICAL EXAMINATION:       Vitals:    09/03/21 0909   Weight: 102 kg (225 lb)   Height: 170.2 cm (67\")   PainSc:   3       Physical Exam in general he is healthy-appearing alert and in no apparent distress at rest.  He responds appropriately to questions and commands.    GAIT:     [x]  Normal  []  Antalgic    Assistive device: [x]  None  []  Walker     []  Crutches  []  Cane     []  Wheelchair  []  Stretcher    Ortho Exam is directed to the upper extremities.  Biceps contours are normal.  The right shoulder is slightly elevated.  There is moderate tightness of the upper portion of the trapezius bilaterally right more than left but with little apparent tenderness.  The acromioclavicular joint is nontender.  Active elevation of the shoulder is smooth but painfully restricted to about 150 degrees.  Strength of abduction and rotation appear normal.  Passive motion of the shoulder is smooth.  Impingement testing produces minor discomfort.  Radial pulse is strong.  Sensory exam is intact to soft touch.    Radiographs of the right shoulder done in August of this year were reviewed.  There is a suggestion of slight spurring of the greater tuberosity of the humerus.  The acromiohumeral space is normal.  There is also what appears to be mild degenerative change of the distal clavicle.  I see no fractures.              ASSESSMENT: Right shoulder and arm pain probably secondary to rotator cuff tendinitis.    The natural history of the disorder and treatment options were reviewed.  I think the prognosis is good for resolution of his symptoms with nonoperative care.    He was given Thera-Band and instructed in strengthening exercises for the shoulder rotators.  Potential benefits of injection therapy were discussed.  He wished to proceed with " this.    The right shoulder was prepped.  Skin was infiltrated with 1% Xylocaine with epinephrine.  The subacromial bursa was injected with a mixture of Marcaine Kenalog and Xylocaine with epinephrine.  There were no complications.  After the injection he reported significant improvement in pain with shoulder motion.    Return here in 1 month if his symptoms have not improved.        Diagnoses and all orders for this visit:    Acute pain of right shoulder    Subacromial bursitis of right shoulder joint    Other orders  -     Large Joint Arthrocentesis: R subacromial bursa          PLAN  Large Joint Arthrocentesis: R subacromial bursa  Date/Time: 9/3/2021 9:47 AM  Consent given by: patient  Site marked: site marked  Timeout: Immediately prior to procedure a time out was called to verify the correct patient, procedure, equipment, support staff and site/side marked as required   Supporting Documentation  Indications: pain   Procedure Details  Location: shoulder - R subacromial bursa  Preparation: Patient was prepped and draped in the usual sterile fashion  Needle size: 22 G  Medications administered: 80 mg triamcinolone acetonide 40 MG/ML; 3 mL bupivacaine 0.5 % (2cc-2%lido/epi [id number])  Patient tolerance: patient tolerated the procedure well with no immediate complications            Return in about 4 weeks (around 10/1/2021).    Blas Messina MD

## 2021-10-01 ENCOUNTER — OFFICE VISIT (OUTPATIENT)
Dept: FAMILY MEDICINE CLINIC | Facility: CLINIC | Age: 56
End: 2021-10-01

## 2021-10-01 VITALS
DIASTOLIC BLOOD PRESSURE: 80 MMHG | OXYGEN SATURATION: 99 % | BODY MASS INDEX: 33.12 KG/M2 | SYSTOLIC BLOOD PRESSURE: 130 MMHG | HEIGHT: 67 IN | HEART RATE: 72 BPM | WEIGHT: 211 LBS

## 2021-10-01 DIAGNOSIS — E11.65 TYPE 2 DIABETES MELLITUS WITH HYPERGLYCEMIA, WITHOUT LONG-TERM CURRENT USE OF INSULIN (HCC): ICD-10-CM

## 2021-10-01 DIAGNOSIS — M54.50 LUMBAR BACK PAIN: Primary | ICD-10-CM

## 2021-10-01 PROCEDURE — 99213 OFFICE O/P EST LOW 20 MIN: CPT | Performed by: STUDENT IN AN ORGANIZED HEALTH CARE EDUCATION/TRAINING PROGRAM

## 2021-10-01 RX ORDER — CYCLOBENZAPRINE HCL 10 MG
10 TABLET ORAL NIGHTLY PRN
Qty: 15 TABLET | Refills: 0 | Status: SHIPPED | OUTPATIENT
Start: 2021-10-01 | End: 2021-10-16
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
## 2021-10-08 ENCOUNTER — OFFICE VISIT (OUTPATIENT)
Dept: ORTHOPEDIC SURGERY | Facility: CLINIC | Age: 56
End: 2021-10-08

## 2021-10-08 VITALS
OXYGEN SATURATION: 99 % | HEART RATE: 66 BPM | SYSTOLIC BLOOD PRESSURE: 120 MMHG | WEIGHT: 221.7 LBS | HEIGHT: 67 IN | BODY MASS INDEX: 34.8 KG/M2 | DIASTOLIC BLOOD PRESSURE: 62 MMHG

## 2021-10-08 DIAGNOSIS — M25.511 ACUTE PAIN OF RIGHT SHOULDER: Primary | ICD-10-CM

## 2021-10-08 DIAGNOSIS — M75.51 SUBACROMIAL BURSITIS OF RIGHT SHOULDER JOINT: ICD-10-CM

## 2021-10-08 PROCEDURE — 99212 OFFICE O/P EST SF 10 MIN: CPT | Performed by: ORTHOPAEDIC SURGERY

## 2021-10-08 NOTE — PROGRESS NOTES
"Ceferino Pereira is a 56 y.o. male returns for     Chief Complaint   Patient presents with   • Right Shoulder - Follow-up       HISTORY OF PRESENT ILLNESS: Patient being seen for right shoulder follow up. injection given 9/3/2021.     Mr. Pereira had an excellent response to his injection.  He is having little to no pain.  He complains mildly of stiffness especially with abduction and internal rotation of the shoulder.       CONCURRENT MEDICAL HISTORY:    The following portions of the patient's history were reviewed and updated as appropriate: allergies, current medications, past family history, past medical history, past social history, past surgical history and problem list.         PHYSICAL EXAMINATION:       /62 (BP Location: Left arm, Patient Position: Sitting, Cuff Size: Adult)   Pulse 66   Ht 170.2 cm (67\")   Wt 101 kg (221 lb 11.2 oz)   SpO2 99%   BMI 34.72 kg/m²     Physical Exam he is alert and in no apparent distress.    GAIT:     [x]  Normal  []  Antalgic    Assistive device: [x]  None  []  Walker     []  Crutches  []  Cane     []  Wheelchair  []  Stretcher    Ortho Exam he has full active elevation of the right shoulder.  Impingement testing is negative.  Strength of abduction and rotation of the shoulder are normal.      Three view right shoulder     HISTORY: Right shoulder pain. Right arm pain.     AP films with the humerus in internal and external rotation and  scapular Y view were obtained.     COMPARISON: Left shoulder September 14, 2017     FINDINGS:   No fracture or dislocation.  No other osseous or articular abnormality.     IMPRESSION:  CONCLUSION:  Normal right shoulder     [id number]     Electronically signed by:  Kar Sepulveda MD  8/20/2021 10:36 AM  CDT Workstation: [facility]          ASSESSMENT: Right shoulder pain, improved.  He was again encouraged in his home exercise.  He may advance activities as tolerated.    If his symptoms recur he will call for reevaluation.  At this " point I see no surgical indications and also see no indications for further imaging of the shoulder.    Diagnoses and all orders for this visit:    Acute pain of right shoulder    Subacromial bursitis of right shoulder joint          PLAN    Return if symptoms worsen or fail to improve.    Blas Messina MD

## 2021-11-22 DIAGNOSIS — E11.65 TYPE 2 DIABETES MELLITUS WITH HYPERGLYCEMIA, WITHOUT LONG-TERM CURRENT USE OF INSULIN (HCC): ICD-10-CM

## 2021-11-22 NOTE — TELEPHONE ENCOUNTER
Incoming Refill Request      Medication requested (name and dose): metFORMIN (GLUCOPHAGE) 500 MG tablet ()    Pharmacy where request should be sent: [facility]    Additional details provided by patient:    Best call back number:[phone]    Does the patient have less than a 3 day supply:  [x] Yes  [] No    Samantha Araujo Rep  21, 14:54 CST

## 2021-12-09 DIAGNOSIS — I10 ESSENTIAL HYPERTENSION: ICD-10-CM

## 2021-12-09 DIAGNOSIS — E11.65 TYPE 2 DIABETES MELLITUS WITH HYPERGLYCEMIA, WITHOUT LONG-TERM CURRENT USE OF INSULIN (HCC): ICD-10-CM

## 2021-12-09 RX ORDER — LISINOPRIL 10 MG/1
10 TABLET ORAL DAILY
Qty: 90 TABLET | Refills: 3 | OUTPATIENT
Start: 2021-12-09 | End: 2022-05-14

## 2021-12-09 RX ORDER — METFORMIN HYDROCHLORIDE 500 MG/1
500 TABLET, EXTENDED RELEASE ORAL
Qty: 180 TABLET | Refills: 3 | Status: SHIPPED | OUTPATIENT
Start: 2021-12-09

## 2022-03-04 ENCOUNTER — OFFICE VISIT (OUTPATIENT)
Dept: SLEEP MEDICINE | Facility: HOSPITAL | Age: 57
End: 2022-03-04

## 2022-03-04 VITALS
OXYGEN SATURATION: 97 % | WEIGHT: 213.1 LBS | BODY MASS INDEX: 33.45 KG/M2 | DIASTOLIC BLOOD PRESSURE: 70 MMHG | HEART RATE: 58 BPM | SYSTOLIC BLOOD PRESSURE: 114 MMHG | HEIGHT: 67 IN

## 2022-03-04 DIAGNOSIS — G47.33 OSA (OBSTRUCTIVE SLEEP APNEA): Primary | ICD-10-CM

## 2022-03-04 PROCEDURE — 99212 OFFICE O/P EST SF 10 MIN: CPT | Performed by: NURSE PRACTITIONER

## 2022-03-04 RX ORDER — DICLOFENAC SODIUM 75 MG/1
75 TABLET, DELAYED RELEASE ORAL 2 TIMES DAILY
COMMUNITY
Start: 2022-02-21 | End: 2022-03-11

## 2022-03-04 NOTE — PROGRESS NOTES
Sleep Clinic Follow Up    Date: 3/4/2022  Primary Care Provider: Shirley Carter APRN    Last office visit: 2021 (I reviewed this note)    CC: Follow up: LAVELL on CPAP new machine follow-up      Interim History:  Since the last visit:    1) severe LAVELL -  Ceferino Pereira has remained compliant with CPAP. He denies mask and machine issues, dry mouth, headaches, or pressures intolerance. He denies abnormal dreams, sleep paralysis, nasal congestion, URI sx.    Overall he is doing well and likes new machine.     2) Patient denies RLS symptoms.       Sleep Testin. PSG on 10/24/2015, AHI of 95   2. CPAP titration , recommended 14 cm H2O   3. Currently on 15 cm H2O    PAP Data:    Time frame: 2022-2022   Compliance: 84 %  Average use on days used: 5hrs 53 min  Percent of days with usage greater than or equal to 4 hours: 80%  PAP range: 15 cm H2O  Average 90% pressure: 15 cmH2O  Leak: 1 hr 40 minutes  Average AHI: 2.7 events/hr  Mask type: Full face mask  DME: [facility]     Bed time: 7337-5145  Sleep latency: 0-5 minutes  Number of times awakens during the night: 1-2  Wake time: 0530  Estimated total sleep time at night: 6-7 hours  Caffeine intake: 1 cups of coffee, 0-1 cups of tea, and 0-1 sodas per day  Alcohol intake: 0 drinks per week  Nap time: denies    Sleepiness with Driving: denies      [place] - 4        PMHx, FH, SH reviewed and pertinent changes are: started on diclofenac for joint pain       REVIEW OF SYSTEMS:   Negative for chest pain, SOA, fever, chills, cough, N/V/D, abdominal pain.    Smoking:none         Exam:  Vitals:    22 1010   BP: 114/70   Pulse: 58   SpO2: 97%           22  1010   Weight: 96.7 kg (213 lb 1.6 oz)     Body mass index is 33.37 kg/m². Patient's Body mass index is 33.37 kg/m². indicating that he is obese (BMI >30). Obesity-related health conditions include the following: obstructive sleep apnea. Obesity is unchanged. BMI is is above average; BMI management  plan is completed. We discussed portion control and increasing exercise..      Gen:                No distress, conversant, pleasant, appears stated age, alert, oriented  Eyes:               Anicteric sclera, moist conjunctiva, no lid lag                           EOMI   Lungs:             normal effort, non-labored breathing                          Clear to auscultation bilaterally          CV:                  Normal S1/S2, no murmur                          no lower extremity edema                 Psych:             Appropriate affect  Neuro:             CN 2-12 appear intact    Past Medical History:   Diagnosis Date   • Aching leg syndrome    • Arthritis    • Carpal tunnel syndrome    • Chronic pharyngitis    • Derangement of posterior horn of medial meniscus due to old tear or injury, unspecified knee     Old tear of posterior horn of medial meniscus    • Derangement of unspecified medial meniscus due to old tear or injury, left knee    • Diabetes mellitus (HCC)    • Gastroesophageal reflux disease    • Obesity    • LAVELL on CPAP    • Osteoarthritis of knee    • Plantar fasciitis    • Radicular pain of lumbosacral region     Pain radiating to lumbar region of back     • Urticaria        Current Outpatient Medications:   •  lisinopril (PRINIVIL,ZESTRIL) 10 MG tablet, Take 1 tablet by mouth Daily., Disp: 90 tablet, Rfl: 3  •  metFORMIN ER (GLUCOPHAGE-XR) 500 MG 24 hr tablet, Take 1 tablet by mouth Daily With Breakfast & Dinner., Disp: 180 tablet, Rfl: 3  •  diclofenac (VOLTAREN) 75 MG EC tablet, Take 75 mg by mouth 2 (Two) Times a Day., Disp: , Rfl:       Assessment and Plan:    1. Obstructive sleep apnea  -Established, stable (1)  1. Compliant with PAP therapy  2. Continue PAP as prescribed  3. Script for PAP supplies  4. Drowsy driving tips- do not drive if feeling sleepy   5. Return to clinic in 12 months with compliance report unless change in symptoms in interim period        I spent 15 minutes caring for  Ceferino on this date of service. This time includes time spent by me in the following activities: preparing for the visit, obtaining and/or reviewing a separately obtained history, performing a medically appropriate examination and/or evaluation, counseling and educating the patient/family/caregiver, ordering medications, tests, or procedures and documenting information in the medical record.           This document has been electronically signed by PALOMA Rossi on March 4, 2022 10:33 CST            CC: Shirley Carter APRN          No ref. provider found

## 2022-03-07 ENCOUNTER — TRANSCRIBE ORDERS (OUTPATIENT)
Dept: ORTHOPEDIC SURGERY | Facility: CLINIC | Age: 57
End: 2022-03-07

## 2022-03-07 DIAGNOSIS — M25.511 RIGHT SHOULDER PAIN, UNSPECIFIED CHRONICITY: ICD-10-CM

## 2022-03-07 DIAGNOSIS — M25.562 ACUTE BILATERAL KNEE PAIN: Primary | ICD-10-CM

## 2022-03-07 DIAGNOSIS — M25.561 ACUTE BILATERAL KNEE PAIN: Primary | ICD-10-CM

## 2022-03-08 DIAGNOSIS — M25.561 CHRONIC PAIN OF BOTH KNEES: Primary | ICD-10-CM

## 2022-03-08 DIAGNOSIS — M25.562 CHRONIC PAIN OF BOTH KNEES: Primary | ICD-10-CM

## 2022-03-08 DIAGNOSIS — G89.29 CHRONIC PAIN OF BOTH KNEES: Primary | ICD-10-CM

## 2022-03-11 ENCOUNTER — OFFICE VISIT (OUTPATIENT)
Dept: ORTHOPEDIC SURGERY | Facility: CLINIC | Age: 57
End: 2022-03-11

## 2022-03-11 VITALS — BODY MASS INDEX: 34.09 KG/M2 | HEIGHT: 67 IN | WEIGHT: 217.2 LBS

## 2022-03-11 DIAGNOSIS — M25.561 CHRONIC PAIN OF BOTH KNEES: Primary | ICD-10-CM

## 2022-03-11 DIAGNOSIS — G89.29 CHRONIC PAIN OF BOTH KNEES: Primary | ICD-10-CM

## 2022-03-11 DIAGNOSIS — M25.562 CHRONIC PAIN OF BOTH KNEES: Primary | ICD-10-CM

## 2022-03-11 DIAGNOSIS — M17.0 PRIMARY OSTEOARTHRITIS OF BOTH KNEES: ICD-10-CM

## 2022-03-11 PROCEDURE — 99214 OFFICE O/P EST MOD 30 MIN: CPT | Performed by: NURSE PRACTITIONER

## 2022-03-11 PROCEDURE — 20610 DRAIN/INJ JOINT/BURSA W/O US: CPT | Performed by: NURSE PRACTITIONER

## 2022-03-11 RX ORDER — LIDOCAINE HYDROCHLORIDE 10 MG/ML
2 INJECTION, SOLUTION EPIDURAL; INFILTRATION; INTRACAUDAL; PERINEURAL
Status: COMPLETED | OUTPATIENT
Start: 2022-03-11 | End: 2022-03-11

## 2022-03-11 RX ORDER — TRIAMCINOLONE ACETONIDE 40 MG/ML
40 INJECTION, SUSPENSION INTRA-ARTICULAR; INTRAMUSCULAR
Status: COMPLETED | OUTPATIENT
Start: 2022-03-11 | End: 2022-03-11

## 2022-03-11 RX ORDER — CELECOXIB 100 MG/1
100 CAPSULE ORAL 2 TIMES DAILY
Qty: 60 CAPSULE | Refills: 2 | Status: SHIPPED | OUTPATIENT
Start: 2022-03-11 | End: 2022-06-09

## 2022-03-11 RX ORDER — LIDOCAINE HYDROCHLORIDE 10 MG/ML
2 INJECTION, SOLUTION INFILTRATION; PERINEURAL
Status: COMPLETED | OUTPATIENT
Start: 2022-03-11 | End: 2022-03-11

## 2022-03-11 RX ADMIN — LIDOCAINE HYDROCHLORIDE 2 ML: 10 INJECTION, SOLUTION EPIDURAL; INFILTRATION; INTRACAUDAL; PERINEURAL at 10:38

## 2022-03-11 RX ADMIN — TRIAMCINOLONE ACETONIDE 40 MG: 40 INJECTION, SUSPENSION INTRA-ARTICULAR; INTRAMUSCULAR at 10:38

## 2022-03-11 RX ADMIN — LIDOCAINE HYDROCHLORIDE 2 ML: 10 INJECTION, SOLUTION INFILTRATION; PERINEURAL at 10:38
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
## 2022-05-14 ENCOUNTER — APPOINTMENT (OUTPATIENT)
Dept: GENERAL RADIOLOGY | Facility: HOSPITAL | Age: 57
End: 2022-05-14

## 2022-05-14 ENCOUNTER — HOSPITAL ENCOUNTER (EMERGENCY)
Facility: HOSPITAL | Age: 57
Discharge: HOME | End: 2022-05-14
Attending: FAMILY MEDICINE | Admitting: FAMILY MEDICINE

## 2022-05-14 VITALS
WEIGHT: 210 LBS | HEIGHT: 68 IN | DIASTOLIC BLOOD PRESSURE: 54 MMHG | SYSTOLIC BLOOD PRESSURE: 96 MMHG | RESPIRATION RATE: 16 BRPM | OXYGEN SATURATION: 99 % | BODY MASS INDEX: 31.83 KG/M2 | HEART RATE: 60 BPM | TEMPERATURE: 98.2 F
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
## 2022-05-14 DIAGNOSIS — M25.512 ACUTE PAIN OF LEFT SHOULDER: ICD-10-CM

## 2022-05-14 PROCEDURE — 73030 X-RAY EXAM OF SHOULDER: CPT

## 2022-05-14 PROCEDURE — 99283 EMERGENCY DEPT VISIT LOW MDM: CPT

## 2022-05-14 PROCEDURE — 25010000002 KETOROLAC TROMETHAMINE PER 15 MG: Performed by: FAMILY MEDICINE

## 2022-05-14 PROCEDURE — 96372 THER/PROPH/DIAG INJ SC/IM: CPT

## 2022-05-14 RX ORDER — KETOROLAC TROMETHAMINE 30 MG/ML
60 INJECTION, SOLUTION INTRAMUSCULAR; INTRAVENOUS ONCE
Status: COMPLETED | OUTPATIENT
Start: 2022-05-14 | End: 2022-05-14

## 2022-05-14 RX ADMIN — KETOROLAC TROMETHAMINE 60 MG: 60 INJECTION, SOLUTION INTRAMUSCULAR at 12:24

## 2022-05-14 NOTE — ED PROVIDER NOTES
Subjective     History provided by:  Patient   used: No    Patient is a 56-year-old male with past medical history of hypertension and diabetes who presented here today because he fell from 4 feet ladder now.  He is currently having some right shoulder pain.  He said he has a right shoulder problem before.  Denies any fever chills or sweating.  Denies any nausea vomiting.  Denies any back pain or any loss of consciousness.    Review of Systems   All other systems reviewed and are negative.      Past Medical History:   Diagnosis Date   • Aching leg syndrome    • Arthritis    • Carpal tunnel syndrome    • Chronic pharyngitis    • Derangement of posterior horn of medial meniscus due to old tear or injury, unspecified knee     Old tear of posterior horn of medial meniscus    • Derangement of unspecified medial meniscus due to old tear or injury, left knee    • Diabetes mellitus (HCC)    • Gastroesophageal reflux disease    • Obesity    • LAVELL on CPAP    • Osteoarthritis of knee    • Plantar fasciitis    • Radicular pain of lumbosacral region     Pain radiating to lumbar region of back     • Urticaria        Allergies   Allergen Reactions   • Penicillins Unknown - High Severity       Past Surgical History:   Procedure Laterality Date   • COLONOSCOPY N/A 3/3/2017    Procedure: COLONOSCOPY;  Surgeon: Delmar Ward MD;  Location: [facility] ENDOSCOPY;  Service:    • FOOT SURGERY Right 2016    infection top of foot   • HAND SURGERY         Family History   Problem Relation Age of Onset   • Heart disease Mother    • Asthma Mother    • Diabetes Mother    • Arthritis Mother    • Heart disease Father    • Hypertension Father    • Heart disease Sister    • Hypertension Sister    • Diabetes Sister    • Heart disease Brother    • Hypertension Brother    • Stroke Brother    • Diabetes Brother    • Clotting disorder Brother        Social History     Socioeconomic History   • Marital status:    Tobacco Use  "  • Smoking status: Never Smoker   • Smokeless tobacco: Never Used   Substance and Sexual Activity   • Alcohol use: No   • Drug use: No   • Sexual activity: Defer       BP 96/54   Pulse 60   Temp 98.2 °F (36.8 °C) (Tympanic)   Resp 16   Ht 172.7 cm (68\")   Wt 95.3 kg (210 lb)   SpO2 99%   BMI 31.93 kg/m²     Objective   Physical Exam  Vitals and nursing note reviewed.   Constitutional:       Appearance: Normal appearance. He is normal weight.   HENT:      Head: Normocephalic and atraumatic.      Right Ear: Tympanic membrane, ear canal and external ear normal.      Left Ear: Tympanic membrane, ear canal and external ear normal.      Nose: Nose normal.   Eyes:      Extraocular Movements: Extraocular movements intact.      Conjunctiva/sclera: Conjunctivae normal.      Pupils: Pupils are equal, round, and reactive to light.   Cardiovascular:      Rate and Rhythm: Normal rate and regular rhythm.      Pulses: Normal pulses.      Heart sounds: Normal heart sounds.   Pulmonary:      Effort: Pulmonary effort is normal.      Breath sounds: Normal breath sounds.   Abdominal:      General: Abdomen is flat. Bowel sounds are normal.      Palpations: Abdomen is soft.   Musculoskeletal:         General: Signs of injury present.      Right shoulder: Tenderness present. Decreased range of motion.      Cervical back: Normal range of motion and neck supple.   Skin:     General: Skin is warm.      Capillary Refill: Capillary refill takes less than 2 seconds.   Neurological:      General: No focal deficit present.      Mental Status: He is alert and oriented to person, place, and time.         Procedures           ED Course  ED Course as of 05/14/22 1316   Sat May 14, 2022   1314 Patient was advised to hold his lisinopril for now.  Told to follow-up with his primary care  3 days.  Come back to the ER symptoms get worse.  Results were discussed with patient. [MO]      ED Course User Index  [MO] Liborio Garza MD          "   Labs Reviewed - No data to display    XR Shoulder 2+ View Right   Final Result   CONCLUSION:   Normal right shoulder      [id number]      Electronically signed by:  Kar Sepulveda MD  5/14/2022 12:53 PM   CDT Workstation: [id number]                                              [facility]    Final diagnoses:   Fall, initial encounter   Acute pain of left shoulder       ED Disposition  ED Disposition     ED Disposition   Discharge    Condition   Stable    Comment   --             No follow-up provider specified.       Medication List      No changes were made to your prescriptions during this visit.          Liborio Garza MD  05/14/22 1999

## 2023-03-10 ENCOUNTER — OFFICE VISIT (OUTPATIENT)
Dept: SLEEP MEDICINE | Facility: HOSPITAL | Age: 58
End: 2023-03-10
Payer: COMMERCIAL

## 2023-03-10 VITALS
DIASTOLIC BLOOD PRESSURE: 75 MMHG | WEIGHT: 219.3 LBS | BODY MASS INDEX: 33.24 KG/M2 | SYSTOLIC BLOOD PRESSURE: 143 MMHG | HEART RATE: 71 BPM | HEIGHT: 68 IN | OXYGEN SATURATION: 98 %

## 2023-03-10 DIAGNOSIS — G47.33 OSA ON CPAP: Primary | ICD-10-CM

## 2023-03-10 DIAGNOSIS — F51.04 PSYCHOPHYSIOLOGICAL INSOMNIA: ICD-10-CM

## 2023-03-10 DIAGNOSIS — Z99.89 OSA ON CPAP: Primary | ICD-10-CM

## 2023-03-10 PROCEDURE — 99213 OFFICE O/P EST LOW 20 MIN: CPT | Performed by: NURSE PRACTITIONER

## 2023-03-10 RX ORDER — TRAMADOL HYDROCHLORIDE 50 MG/1
50-100 TABLET ORAL 2 TIMES DAILY
COMMUNITY
Start: 2023-02-20

## 2023-03-10 RX ORDER — ACETAMINOPHEN 500 MG
1000 TABLET ORAL
COMMUNITY

## 2023-03-10 RX ORDER — DICLOFENAC SODIUM 75 MG/1
75 TABLET, DELAYED RELEASE ORAL
COMMUNITY
Start: 2022-09-26

## 2023-03-10 NOTE — PROGRESS NOTES
Sleep Clinic Follow Up    Date: 3/10/2023  Primary Care Provider: Shirley Carter APRN    Last office visit: 2022 (I reviewed this note)    CC: Follow up: LAVELL on CPAP, annual       Interim History:  Since the last visit:    1) severe LAVELL -  Ceferino Pereira has remained compliant with CPAP. He denies mask and machine issues, dry mouth, headaches, or pressures intolerance. He denies abnormal dreams, sleep paralysis, nasal congestion, URI sx.      2) Patient denies RLS symptoms.     Chronic knee pain has affected ability to stay asleep. Started on melatonin which helps.       Sleep Testin. PSG on 10/24/2015, AHI of 95   2. CPAP titration, recommended 14 cm H2O   3. Currently on 15 cm H2O    PAP Data:    Time frame: 2022-2023   Compliance: 99 %  Average use on days used: 7hrs 26 min  Percent of days with usage greater than or equal to 4 hours: 97.8%  PAP range: 15 cm H2O  Average 90% pressure: 15 cmH2O  Leak: 17 minutes  Average AHI: 2 events/hr  Mask type: Full face mask  DME: [facility]     Bed time: 4771-2607  Sleep latency: 15 minutes  Number of times awakens during the night: 5-6  Wake time: 0530  Estimated total sleep time at night: 5-7 hours  Caffeine intake: 1-2 cups of coffee, 0-1 cups of tea, and 0-2 sodas per day  Alcohol intake: 0 drinks per week  Nap time: some days    Sleepiness with Driving: denies      [place] - 6        PMHx, FH, SH reviewed and pertinent changes are: right shoulder surgery, started on tramadol for chronic knee pain, taking melatonin       REVIEW OF SYSTEMS:   Negative for chest pain, SOA, fever, chills, cough, N/V/D, abdominal pain.    Smoking:none        Exam:  Vitals:    03/10/23 0810   BP: 143/75   Pulse: 71   SpO2: 98%           03/10/23  0810   Weight: 99.5 kg (219 lb 4.8 oz)     Body mass index is 33.34 kg/m². BMI is >= 30 and <35. (Class 1 Obesity). The following options were offered after discussion;: nutrition counseling/recommendations      Gen:                 No distress, conversant, pleasant, appears stated age, alert, oriented  Eyes:               Anicteric sclera, moist conjunctiva, no lid lag                           EOMI   Lungs:             normal effort, non-labored breathing                          Clear to auscultation bilaterally          CV:                  Normal S1/S2, no murmur                          no lower extremity edema                 Psych:             Appropriate affect  Neuro:             CN 2-12 appear intact    Past Medical History:   Diagnosis Date   • Aching leg syndrome    • Arthritis    • Carpal tunnel syndrome    • Chronic pharyngitis    • Derangement of posterior horn of medial meniscus due to old tear or injury, unspecified knee     Old tear of posterior horn of medial meniscus    • Derangement of unspecified medial meniscus due to old tear or injury, left knee    • Diabetes mellitus (HCC)    • Gastroesophageal reflux disease    • Obesity    • LAVELL on CPAP    • Osteoarthritis of knee    • Plantar fasciitis    • Radicular pain of lumbosacral region     Pain radiating to lumbar region of back     • Urticaria        Current Outpatient Medications:   •  metFORMIN ER (GLUCOPHAGE-XR) 500 MG 24 hr tablet, Take 1 tablet by mouth Daily With Breakfast & Dinner., Disp: 180 tablet, Rfl: 3  WBC   Date Value Ref Range Status   08/20/2021 9.38 3.40 - 10.80 10*3/mm3 Final     RBC   Date Value Ref Range Status   08/20/2021 5.33 4.14 - 5.80 10*6/mm3 Final     Hemoglobin   Date Value Ref Range Status   08/20/2021 15.2 13.0 - 17.7 g/dL Final     Hematocrit   Date Value Ref Range Status   08/20/2021 45.2 37.5 - 51.0 % Final     MCV   Date Value Ref Range Status   08/20/2021 84.8 79.0 - 97.0 fL Final     MCH   Date Value Ref Range Status   08/20/2021 28.5 26.6 - 33.0 pg Final     MCHC   Date Value Ref Range Status   08/20/2021 33.6 31.5 - 35.7 g/dL Final     RDW   Date Value Ref Range Status   08/20/2021 13.1 12.3 - 15.4 % Final     RDW-SD   Date  Value Ref Range Status   08/20/2021 40.0 37.0 - 54.0 fl Final     MPV   Date Value Ref Range Status   08/20/2021 10.7 6.0 - 12.0 fL Final     Platelets   Date Value Ref Range Status   08/20/2021 284 140 - 450 10*3/mm3 Final     Neutrophil %   Date Value Ref Range Status   08/20/2021 48.8 42.7 - 76.0 % Final     Lymphocyte %   Date Value Ref Range Status   08/20/2021 41.0 19.6 - 45.3 % Final     Monocyte %   Date Value Ref Range Status   08/20/2021 7.5 5.0 - 12.0 % Final     Eosinophil %   Date Value Ref Range Status   08/20/2021 1.9 0.3 - 6.2 % Final     Basophil %   Date Value Ref Range Status   08/20/2021 0.5 0.0 - 1.5 % Final     Immature Grans %   Date Value Ref Range Status   08/20/2021 0.3 0.0 - 0.5 % Final     Neutrophils, Absolute   Date Value Ref Range Status   08/20/2021 4.57 1.70 - 7.00 10*3/mm3 Final     Lymphocytes, Absolute   Date Value Ref Range Status   08/20/2021 3.85 (H) 0.70 - 3.10 10*3/mm3 Final     Monocytes, Absolute   Date Value Ref Range Status   08/20/2021 0.70 0.10 - 0.90 10*3/mm3 Final     Eosinophils, Absolute   Date Value Ref Range Status   08/20/2021 0.18 0.00 - 0.40 10*3/mm3 Final     Basophils, Absolute   Date Value Ref Range Status   08/20/2021 0.05 0.00 - 0.20 10*3/mm3 Final     Immature Grans, Absolute   Date Value Ref Range Status   08/20/2021 0.03 0.00 - 0.05 10*3/mm3 Final     nRBC   Date Value Ref Range Status   08/20/2021 0.0 0.0 - 0.2 /100 WBC Final       Lab Results   Component Value Date    GLUCOSE 115 (H) 08/20/2021    BUN 19 12/30/2022    CREATININE 0.8 12/30/2022    BCR 16.1 08/20/2021    K 4.7 12/30/2022    CO2 29 12/30/2022    CALCIUM 9.3 12/30/2022    ALBUMIN 4.2 12/30/2022    BILITOT 0.48 12/30/2022    AST 14 12/30/2022    ALT 17 12/30/2022         Assessment and Plan:    1. Obstructive sleep apnea- Established, stable   1. Compliant with PAP therapy- compliance report reviewed with patient   2. Continue PAP as prescribed  3. Script for PAP supplies  4. Pertinent  labs reviewed   5. Drowsy driving tips- do not drive if feeling sleepy   6. Return to clinic in 12 months with compliance report unless change in symptoms in interim period    2. insomina    1.   Good sleep hygiene    2.   Continue melatonin as needed         I spent 15 minutes caring for Ceferino on this date of service. This time includes time spent by me in the following activities: preparing for the visit, obtaining and/or reviewing a separately obtained history, performing a medically appropriate examination and/or evaluation, counseling and educating the patient/family/caregiver, ordering medications, tests, or procedures and documenting information in the medical record. Educated on PAP management, maintenance, and compliance.           This document has been electronically signed by PALOMA Rossi on March 10, 2023 08:12 CST            CC: Shirley Carter APRN          No ref. provider found

## (undated) DEVICE — CANN SMPL SOFTECH BIFLO ETCO2 A/M 7FT